# Patient Record
Sex: FEMALE | Race: WHITE | NOT HISPANIC OR LATINO | Employment: OTHER
[De-identification: names, ages, dates, MRNs, and addresses within clinical notes are randomized per-mention and may not be internally consistent; named-entity substitution may affect disease eponyms.]

---

## 2018-11-09 RX ORDER — DESONIDE 0.5 MG/G
OINTMENT TOPICAL 2 TIMES DAILY
COMMUNITY

## 2018-11-09 RX ORDER — LANOLIN ALCOHOL/MO/W.PET/CERES
CREAM (GRAM) TOPICAL DAILY
Status: ON HOLD | COMMUNITY
End: 2024-02-09

## 2018-11-12 ENCOUNTER — SURGERY (OUTPATIENT)
Age: 78
End: 2018-11-12

## 2018-11-12 ENCOUNTER — ANESTHESIA EVENT (OUTPATIENT)
Dept: SURGERY | Facility: CLINIC | Age: 78
End: 2018-11-12
Payer: COMMERCIAL

## 2018-11-12 ENCOUNTER — ANESTHESIA (OUTPATIENT)
Dept: SURGERY | Facility: CLINIC | Age: 78
End: 2018-11-12
Payer: COMMERCIAL

## 2018-11-12 ENCOUNTER — HOSPITAL ENCOUNTER (OUTPATIENT)
Facility: CLINIC | Age: 78
Discharge: HOME | End: 2018-11-12
Attending: OPHTHALMOLOGY | Admitting: OPHTHALMOLOGY
Payer: COMMERCIAL

## 2018-11-12 VITALS
TEMPERATURE: 96.8 F | RESPIRATION RATE: 16 BRPM | WEIGHT: 194 LBS | OXYGEN SATURATION: 92 % | SYSTOLIC BLOOD PRESSURE: 105 MMHG | HEIGHT: 57 IN | DIASTOLIC BLOOD PRESSURE: 75 MMHG | BODY MASS INDEX: 41.85 KG/M2

## 2018-11-12 DIAGNOSIS — H25.011 CORTICAL AGE-RELATED CATARACT OF RIGHT EYE: Primary | ICD-10-CM

## 2018-11-12 LAB
GLUCOSE BLDC GLUCOMTR-MCNC: 100 MG/DL (ref 70–99)
GLUCOSE BLDC GLUCOMTR-MCNC: 109 MG/DL (ref 70–99)

## 2018-11-12 PROCEDURE — 25000128 H RX IP 250 OP 636: Performed by: OPHTHALMOLOGY

## 2018-11-12 PROCEDURE — 25000128 H RX IP 250 OP 636: Performed by: NURSE ANESTHETIST, CERTIFIED REGISTERED

## 2018-11-12 PROCEDURE — 27210794 ZZH OR GENERAL SUPPLY STERILE: Performed by: OPHTHALMOLOGY

## 2018-11-12 PROCEDURE — V2632 POST CHMBR INTRAOCULAR LENS: HCPCS | Performed by: OPHTHALMOLOGY

## 2018-11-12 PROCEDURE — 37000008 ZZH ANESTHESIA TECHNICAL FEE, 1ST 30 MIN: Performed by: OPHTHALMOLOGY

## 2018-11-12 PROCEDURE — 36000101 ZZH EYE SURGERY LEVEL 3 1ST 30 MIN: Performed by: OPHTHALMOLOGY

## 2018-11-12 PROCEDURE — 25000125 ZZHC RX 250: Performed by: OPHTHALMOLOGY

## 2018-11-12 PROCEDURE — 25000128 H RX IP 250 OP 636: Performed by: ANESTHESIOLOGY

## 2018-11-12 PROCEDURE — 40000170 ZZH STATISTIC PRE-PROCEDURE ASSESSMENT II: Performed by: OPHTHALMOLOGY

## 2018-11-12 PROCEDURE — 71000028 ZZH EYE RECOVERY PHASE 2 EACH 15 MINS: Performed by: OPHTHALMOLOGY

## 2018-11-12 PROCEDURE — 25000125 ZZHC RX 250: Performed by: NURSE ANESTHETIST, CERTIFIED REGISTERED

## 2018-11-12 PROCEDURE — 82962 GLUCOSE BLOOD TEST: CPT

## 2018-11-12 DEVICE — EYE IMP IOL AMO PCL TECNIS ZCB00 24.5: Type: IMPLANTABLE DEVICE | Site: EYE | Status: FUNCTIONAL

## 2018-11-12 RX ORDER — BALANCED SALT SOLUTION 6.4; .75; .48; .3; 3.9; 1.7 MG/ML; MG/ML; MG/ML; MG/ML; MG/ML; MG/ML
SOLUTION OPHTHALMIC PRN
Status: DISCONTINUED | OUTPATIENT
Start: 2018-11-12 | End: 2018-11-12 | Stop reason: HOSPADM

## 2018-11-12 RX ORDER — PREDNISOLONE ACETATE 1 %
SUSPENSION, DROPS(FINAL DOSAGE FORM)(ML) OPHTHALMIC (EYE) PRN
Status: DISCONTINUED | OUTPATIENT
Start: 2018-11-12 | End: 2018-11-12 | Stop reason: HOSPADM

## 2018-11-12 RX ORDER — DICLOFENAC SODIUM 1 MG/ML
1 SOLUTION/ DROPS OPHTHALMIC
Status: COMPLETED | OUTPATIENT
Start: 2018-11-12 | End: 2018-11-12

## 2018-11-12 RX ORDER — TETRACAINE HYDROCHLORIDE 5 MG/ML
SOLUTION OPHTHALMIC PRN
Status: DISCONTINUED | OUTPATIENT
Start: 2018-11-12 | End: 2018-11-12 | Stop reason: HOSPADM

## 2018-11-12 RX ORDER — MOXIFLOXACIN 5 MG/ML
1 SOLUTION/ DROPS OPHTHALMIC
Status: COMPLETED | OUTPATIENT
Start: 2018-11-12 | End: 2018-11-12

## 2018-11-12 RX ORDER — SODIUM CHLORIDE, SODIUM LACTATE, POTASSIUM CHLORIDE, CALCIUM CHLORIDE 600; 310; 30; 20 MG/100ML; MG/100ML; MG/100ML; MG/100ML
500 INJECTION, SOLUTION INTRAVENOUS CONTINUOUS
Status: DISCONTINUED | OUTPATIENT
Start: 2018-11-12 | End: 2018-11-12 | Stop reason: HOSPADM

## 2018-11-12 RX ORDER — PROPARACAINE HYDROCHLORIDE 5 MG/ML
1 SOLUTION/ DROPS OPHTHALMIC ONCE
Status: DISCONTINUED | OUTPATIENT
Start: 2018-11-12 | End: 2018-11-12 | Stop reason: HOSPADM

## 2018-11-12 RX ORDER — ONDANSETRON 2 MG/ML
INJECTION INTRAMUSCULAR; INTRAVENOUS PRN
Status: DISCONTINUED | OUTPATIENT
Start: 2018-11-12 | End: 2018-11-12

## 2018-11-12 RX ORDER — TROPICAMIDE 10 MG/ML
1 SOLUTION/ DROPS OPHTHALMIC
Status: COMPLETED | OUTPATIENT
Start: 2018-11-12 | End: 2018-11-12

## 2018-11-12 RX ORDER — PROPARACAINE HYDROCHLORIDE 5 MG/ML
1 SOLUTION/ DROPS OPHTHALMIC ONCE
Status: COMPLETED | OUTPATIENT
Start: 2018-11-12 | End: 2018-11-12

## 2018-11-12 RX ORDER — LIDOCAINE HYDROCHLORIDE 10 MG/ML
INJECTION, SOLUTION EPIDURAL; INFILTRATION; INTRACAUDAL; PERINEURAL PRN
Status: DISCONTINUED | OUTPATIENT
Start: 2018-11-12 | End: 2018-11-12 | Stop reason: HOSPADM

## 2018-11-12 RX ORDER — PHENYLEPHRINE HYDROCHLORIDE 25 MG/ML
1 SOLUTION/ DROPS OPHTHALMIC
Status: COMPLETED | OUTPATIENT
Start: 2018-11-12 | End: 2018-11-12

## 2018-11-12 RX ADMIN — DICLOFENAC SODIUM 1 DROP: 1 SOLUTION OPHTHALMIC at 10:03

## 2018-11-12 RX ADMIN — TROPICAMIDE 1 DROP: 10 SOLUTION/ DROPS OPHTHALMIC at 09:50

## 2018-11-12 RX ADMIN — SODIUM CHLORIDE, POTASSIUM CHLORIDE, SODIUM LACTATE AND CALCIUM CHLORIDE 500 ML: 600; 310; 30; 20 INJECTION, SOLUTION INTRAVENOUS at 10:10

## 2018-11-12 RX ADMIN — TROPICAMIDE 1 DROP: 10 SOLUTION/ DROPS OPHTHALMIC at 09:56

## 2018-11-12 RX ADMIN — LIDOCAINE HYDROCHLORIDE 1 ML: 10 INJECTION, SOLUTION EPIDURAL; INFILTRATION; INTRACAUDAL; PERINEURAL at 10:48

## 2018-11-12 RX ADMIN — PHENYLEPHRINE HYDROCHLORIDE 1 DROP: 2.5 SOLUTION/ DROPS OPHTHALMIC at 09:50

## 2018-11-12 RX ADMIN — ONDANSETRON 4 MG: 2 INJECTION INTRAMUSCULAR; INTRAVENOUS at 10:36

## 2018-11-12 RX ADMIN — DEXMEDETOMIDINE HYDROCHLORIDE 12 MCG: 100 INJECTION, SOLUTION INTRAVENOUS at 10:33

## 2018-11-12 RX ADMIN — MOXIFLOXACIN 1 DROP: 5 SOLUTION/ DROPS OPHTHALMIC at 09:50

## 2018-11-12 RX ADMIN — TETRACAINE HYDROCHLORIDE 2 DROP: 5 SOLUTION OPHTHALMIC at 10:42

## 2018-11-12 RX ADMIN — TROPICAMIDE 1 DROP: 10 SOLUTION/ DROPS OPHTHALMIC at 10:03

## 2018-11-12 RX ADMIN — PROPARACAINE HYDROCHLORIDE 1 DROP: 5 SOLUTION/ DROPS OPHTHALMIC at 09:52

## 2018-11-12 RX ADMIN — Medication 1 APPLICATOR: at 10:48

## 2018-11-12 RX ADMIN — MOXIFLOXACIN 1 DROP: 5 SOLUTION/ DROPS OPHTHALMIC at 09:56

## 2018-11-12 RX ADMIN — DICLOFENAC SODIUM 1 DROP: 1 SOLUTION OPHTHALMIC at 09:56

## 2018-11-12 RX ADMIN — CEFUROXIME 0.1 ML: 1.5 INJECTION, POWDER, FOR SOLUTION INTRAVENOUS at 10:53

## 2018-11-12 RX ADMIN — Medication 1 DROP: at 10:53

## 2018-11-12 RX ADMIN — MOXIFLOXACIN 1 DROP: 5 SOLUTION/ DROPS OPHTHALMIC at 10:03

## 2018-11-12 RX ADMIN — PHENYLEPHRINE HYDROCHLORIDE 1 DROP: 2.5 SOLUTION/ DROPS OPHTHALMIC at 09:56

## 2018-11-12 RX ADMIN — DEXMEDETOMIDINE HYDROCHLORIDE 8 MCG: 100 INJECTION, SOLUTION INTRAVENOUS at 10:37

## 2018-11-12 RX ADMIN — DICLOFENAC SODIUM 1 DROP: 1 SOLUTION OPHTHALMIC at 09:50

## 2018-11-12 RX ADMIN — EPINEPHRINE 500 ML: 1 INJECTION, SOLUTION, CONCENTRATE INTRAVENOUS at 10:47

## 2018-11-12 RX ADMIN — BALANCED SALT SOLUTION 15 ML: 6.4; .75; .48; .3; 3.9; 1.7 SOLUTION OPHTHALMIC at 10:47

## 2018-11-12 RX ADMIN — PHENYLEPHRINE HYDROCHLORIDE 1 DROP: 2.5 SOLUTION/ DROPS OPHTHALMIC at 10:03

## 2018-11-12 ASSESSMENT — LIFESTYLE VARIABLES: TOBACCO_USE: 0

## 2018-11-12 ASSESSMENT — ENCOUNTER SYMPTOMS: SEIZURES: 0

## 2018-11-12 NOTE — IP AVS SNAPSHOT
[facility]    [address]    [place] [address]    Phone:  [phone]    Fax:  [phone]                                       After Visit Summary   11/12/2018    Leola Anguiano    MRN: [id number]           After Visit Summary Signature Page     I have received my discharge instructions, and my questions have been answered. I have discussed any challenges I see with this plan with the nurse or doctor.    ..........................................................................................................................................  Patient/Patient Representative Signature      ..........................................................................................................................................  Patient Representative Print Name and Relationship to Patient    ..................................................               ................................................  Date                                   Time    ..........................................................................................................................................  Reviewed by Signature/Title    ...................................................              ..............................................  Date                                               Time          22EPIC Rev 08/18

## 2018-11-12 NOTE — ANESTHESIA CARE TRANSFER NOTE
Patient: Leola Anguiano    Procedure(s):  RIGHT EYE PHACOEMULSIFICATION CLEAR CORNEA WITH STANDARD INTRAOCULAR LENS IMPLANT     Diagnosis: CATARACT   Diagnosis Additional Information: No value filed.    Anesthesia Type:   MAC     Note:  Airway :Room Air  Patient transferred to:PACU  Comments: Anesthesia Care Note    Patient: Leola Anguiano    Transferred to: PACU    Patient vital signs: Stable    Airway: None    Monitors placed. VSS. PIV patent. No change in dentition. Report given to DICKSON Lindo CRNA   11/12/2018  Handoff Report: Identifed the Patient, Identified the Reponsible Provider, Reviewed the pertinent medical history, Discussed the surgical course, Reviewed Intra-OP anesthesia mangement and issues during anesthesia, Set expectations for post-procedure period and Allowed opportunity for questions and acknowledgement of understanding      Vitals: (Last set prior to Anesthesia Care Transfer)    CRNA VITALS  11/12/2018 1028 - 11/12/2018 1101      11/12/2018             SpO2: 96 %    Resp Rate (set): 10                Electronically Signed By: JUAN ALBERTO Farrar CRNA  November 12, 2018  11:01 AM

## 2018-11-12 NOTE — IP AVS SNAPSHOT
MRN:[id number]                      After Visit Summary   11/12/2018    Leola Anguiano    MRN: [id number]           Thank you!     Thank you for choosing [facility] for your care. Our goal is always to provide you with excellent care. Hearing back from our patients is one way we can continue to improve our services. Please take a few minutes to complete the written survey that you may receive in the mail after you visit with us. Thank you!        Patient Information     Date Of Birth          1940        About your hospital stay     You were admitted on:  November 12, 2018 You last received care in the:  [facility] [place]    You were discharged on:  November 12, 2018       Who to Call     For medical emergencies, please call 911.  For non-urgent questions about your medical care, please call your primary care provider or clinic, [phone]  For questions related to your surgery, please call your surgery clinic        Attending Provider     Provider Specialty    Marco A Hernandez MD Ophthalmology       Primary Care Provider Office Phone # Fax #    Roger Corral [phone]      Further instructions from your care team       [facility] Discharge  Instructions  Anesthesia (Eye Care Center)   Adult Discharge Instructions    For 24 hours after surgery    1. Get plenty of rest.  Make arrangements to have a responsible adult stay with you for at least 24 hours after you leave the hospital.  2. Do not drive or use heavy equipment for 24 hours.    3. Do not drink alcohol for 24 hours.  4. Do not sign legal documents or make important decisions for 24 hours.  5. Avoid strenuous or risky activities. You may feel lightheaded.  If so, sit for a few minutes before standing.  Have someone help you get up.   6. Conscious sedation patients may resume a regular diet..  7. Any questions of medical nature, call your physician.    Dr. Strickland  "David  [facility]  [phone]  Post Operative Cataract Instructions        You may remove the eye shield on arrival home and begin eye drops as directed when you get home.    Prednisolone - 1 drop 3 times/day until gone  Ofloxacin - 1 drop 3 times/day until gone   Ketorolac - 1 drop 3 times/day until gone        Wear eye shield when sleeping for protection for 5 days.      Do not rub the operated eye.      Light sensitivity may be noticed. Sunglasses may be worn for comfort.      Some discomfort and irritation may be noticed. Acetaminophen (Tylenol) or Ibuprofen (Advil) may be taken for discomfort. If pain persists please call Dr. Hernandez's office.      Keep the operated eye dry. You may wash your hair, bathe or shower, but keep the operated eye closed while doing so.       Use medication exactly as prescribed by your doctor. You may restart your regular home medications.      If your procedure included an ECP (Endoscopic Cyclophotocoagulation), you should take Diamox 500 mg at bedtime as directed by your physician.       No eye makeup on or around the eye for 1 week.      Bring any glaucoma medications with you to the clinic on your first post operative visit.      Call Dr. eHrnandez's office if any of the following should occur:    o Any sudden vision changes  o Nausea or severe headache  o Increase in pain not controlled  o Or signs of infection (pus, increasing redness or tenderness)    Pending Results     No orders found from 11/10/2018 to 11/13/2018.            Admission Information     Date & Time Provider Department Dept. Phone    11/12/2018 Marco A Hernandez MD [facility] [phone]      Your Vitals Were     Blood Pressure Temperature Respirations Height Weight Pulse Oximetry    105/75 96.8  F (36  C) (Temporal) 16 1.448 m (4' 9\") 88 kg (194 lb 0.1 oz) 92%    BMI (Body Mass Index)                   41.98 kg/m2           [employer] Information     [employer] lets you send messages to " "your doctor, view your test results, renew your prescriptions, schedule appointments and more. To sign up, go to [email or web address] . Click on \"Log in\" on the left side of the screen, which will take you to the Welcome page. Then click on \"Sign up Now\" on the right side of the page.     You will be asked to enter the access code listed below, as well as some personal information. Please follow the directions to create your username and password.     Your access code is: [email or web address]  Expires: 2/10/2019 11:27 AM     Your access code will  in 90 days. If you need help or a new code, please call your [facility] clinic or [phone].        Care EveryWhere ID     This is your Care EveryWhere ID. This could be used by other organizations to access your [facility] medical records  [id number]        Equal Access to Services     [facility] : Ritu Subramanian, pili norwood, honey elliott, yvon ortiz . So [facility] [phone].    ATENCIÓN: Si habla español, tiene a walker disposición servicios gratuitos de asistencia lingüística. Llame al [phone].    We comply with applicable federal civil rights laws and [place] laws. We do not discriminate on the basis of race, color, national origin, age, disability, sex, sexual orientation, or gender identity.               Review of your medicines      UNREVIEWED medicines. Ask your doctor about these medicines        Dose / Directions    ACETAMINOPHEN PO        Dose:  325 mg   Take 325 mg by mouth every 4 hours as needed for pain   Refills:  0       ASPIRIN PO        Dose:  81 mg   Take 81 mg by mouth daily   Refills:  0       CALCIUM 600 PO        Take by mouth 2 times daily   Refills:  0       COLACE PO        Dose:  100 mg   Take 100 mg by mouth 2 times daily   Refills:  0       cyanocobalamin 1000 MCG tablet   Commonly known as:  vitamin  B-12        Take by mouth daily   Refills:  0       desonide 0.05 % " ointment   Commonly known as:  DESOWEN        Apply topically 2 times daily   Refills:  0       hydrocortisone 2.5 % cream   Commonly known as:  ANUSOL-HC        Place rectally 2 times daily as needed for hemorrhoids   Refills:  0       IBUPROFEN PO        Dose:  200 mg   Take 200 mg by mouth every 6 hours as needed for moderate pain   Refills:  0       LIPITOR PO        Dose:  40 mg   Take 40 mg by mouth daily   Refills:  0       METFORMIN HCL PO        Dose:  500 mg   Take 500 mg by mouth 2 times daily (with meals)   Refills:  0       OMEGA-3 & OMEGA-6 FISH OIL PO        Refills:  0       TOPROL XL PO        Dose:  25 mg   Take 25 mg by mouth 2 times daily   Refills:  0                Protect others around you: Learn how to safely use, store and throw away your medicines at www.disposemymeds.org.             Medication List: This is a list of all your medications and when to take them. Check marks below indicate your daily home schedule. Keep this list as a reference.      Medications           Morning Afternoon Evening Bedtime As Needed    ACETAMINOPHEN PO   Take 325 mg by mouth every 4 hours as needed for pain                                ASPIRIN PO   Take 81 mg by mouth daily                                CALCIUM 600 PO   Take by mouth 2 times daily                                COLACE PO   Take 100 mg by mouth 2 times daily                                cyanocobalamin 1000 MCG tablet   Commonly known as:  vitamin  B-12   Take by mouth daily                                desonide 0.05 % ointment   Commonly known as:  DESOWEN   Apply topically 2 times daily                                hydrocortisone 2.5 % cream   Commonly known as:  ANUSOL-HC   Place rectally 2 times daily as needed for hemorrhoids                                IBUPROFEN PO   Take 200 mg by mouth every 6 hours as needed for moderate pain                                LIPITOR PO   Take 40 mg by mouth daily                                 METFORMIN HCL PO   Take 500 mg by mouth 2 times daily (with meals)                                OMEGA-3 & OMEGA-6 FISH OIL PO                                TOPROL XL PO   Take 25 mg by mouth 2 times daily

## 2018-11-12 NOTE — ANESTHESIA POSTPROCEDURE EVALUATION
Patient: Leola Anguiano    Procedure(s):  RIGHT EYE PHACOEMULSIFICATION CLEAR CORNEA WITH STANDARD INTRAOCULAR LENS IMPLANT     Diagnosis:CATARACT   Diagnosis Additional Information: No value filed.    Anesthesia Type:  MAC    Note:  Anesthesia Post Evaluation    Patient location during evaluation: PACU  Patient participation: Able to fully participate in evaluation  Level of consciousness: awake and alert  Pain management: satisfactory to patient  Airway patency: patent  Cardiovascular status: hemodynamically stable  Respiratory status: acceptable and unassisted  Hydration status: balanced  PONV: none     Anesthetic complications: None          Last vitals:  Vitals:    11/12/18 1009 11/12/18 1101 11/12/18 1121   BP: 141/80 122/85 105/75   Resp: 14 16 16   Temp: 36  C (96.8  F)     SpO2:  95% 92%         Electronically Signed By: Elias Wilson MD  November 12, 2018  3:53 PM

## 2018-11-12 NOTE — OR NURSING
Post op instructions reviewed with pt and son thru . Post op bgm = 109. Instructed pt to check bld sugars more often the next 24 hrs post surgery. VSS, denies pain. Son here for . D/C'd to home, no further questions, son/pt verbalized understanding of discharge instructions.

## 2018-11-12 NOTE — ANESTHESIA PREPROCEDURE EVALUATION
Anesthesia Evaluation     . Pt has had prior anesthetic.     No history of anesthetic complications          ROS/MED HX    ENT/Pulmonary:      (-) tobacco use and sleep apnea   Neurologic:      (-) seizures and CVA   Cardiovascular:     (+) Dyslipidemia, hypertension----. : . . . :. .       METS/Exercise Tolerance:     Hematologic:         Musculoskeletal:         GI/Hepatic:        (-) GERD and liver disease   Renal/Genitourinary:      (-) renal disease   Endo:     (+) type II DM Obesity (BMI 42), .   (-) thyroid disease   Psychiatric:         Infectious Disease:         Malignancy:         Other:                     Physical Exam  Normal systems: cardiovascular, pulmonary and dental    Airway   Mallampati: II  TM distance: >3 FB  Neck ROM: full    Dental     Cardiovascular       Pulmonary                     Anesthesia Plan      History & Physical Review  History and physical reviewed and following examination; no interval change.    ASA Status:  3 .    NPO Status:  > 8 hours    Plan for MAC Reason for MAC:  Procedure to face, neck, head or breast  PONV prophylaxis:  Ondansetron (or other 5HT-3)       Postoperative Care      Consents  Anesthetic plan, risks, benefits and alternatives discussed with:  Patient..        Procedure: Procedure(s):  RIGHT EYE PHACOEMULSIFICATION CLEAR CORNEA WITH STANDARD INTRAOCULAR LENS IMPLANT ( MAC TOPICAL) ( LANGUAGE : Tunisian )  Preop diagnosis: CATARACT     Allergies   Allergen Reactions     Betadine [Povidone Iodine]      Chlorhexidine      Hibiclens      Past Medical History:   Diagnosis Date     Arthritis      Diabetes (H)      Hyperlipemia      Hyperlipemia      Hypertension      Obese      Palpitations      Rotator cuff tear, right      Tachycardia, unspecified      Vitamin B12 deficiency      Past Surgical History:   Procedure Laterality Date     CHOLECYSTECTOMY       GYN SURGERY      hyst     HEMORRHOIDECTOMY       HERNIA REPAIR       ORTHOPEDIC SURGERY      rotator cuff  repair     Prior to Admission medications    Medication Sig Start Date End Date Taking? Authorizing Provider   ACETAMINOPHEN PO Take 325 mg by mouth every 4 hours as needed for pain   Yes Reported, Patient   ASPIRIN PO Take 81 mg by mouth daily   Yes Reported, Patient   Atorvastatin Calcium (LIPITOR PO) Take 40 mg by mouth daily   Yes Reported, Patient   Calcium Carbonate (CALCIUM 600 PO) Take by mouth 2 times daily   Yes Reported, Patient   cyanocobalamin (VITAMIN  B-12) 1000 MCG tablet Take by mouth daily   Yes Reported, Patient   desonide (DESOWEN) 0.05 % ointment Apply topically 2 times daily   Yes Reported, Patient   Docusate Sodium (COLACE PO) Take 100 mg by mouth 2 times daily   Yes Reported, Patient   hydrocortisone (ANUSOL-HC) 2.5 % cream Place rectally 2 times daily as needed for hemorrhoids   Yes Reported, Patient   IBUPROFEN PO Take 200 mg by mouth every 6 hours as needed for moderate pain   Yes Reported, Patient   METFORMIN HCL PO Take 500 mg by mouth 2 times daily (with meals)   Yes Reported, Patient   Metoprolol Succinate (TOPROL XL PO) Take 25 mg by mouth 2 times daily   Yes Reported, Patient   Omega 3-6-9 Fatty Acids (OMEGA-3 & OMEGA-6 FISH OIL PO)    Yes Reported, Patient     Current Facility-Administered Medications Ordered in Epic   Medication Dose Route Frequency Last Rate Last Dose     diclofenac (VOLTAREN) 0.1 % ophthalmic solution 1 drop  1 drop Ophthalmic q5 Min Prior to Surgery         lactated ringers infusion  500 mL Intravenous Continuous         lidocaine (AKTEN) ophthalmic gel 0.5 mL  0.5 mL Ophthalmic Once         moxifloxacin (VIGAMOX) 0.5 % ophthalmic solution 1 drop  1 drop Ophthalmic q5 Min Prior to Surgery         phenylephrine (MYDFRIN /MONA-SYNEPHRINE) 2.5 % ophthalmic solution 1 drop  1 drop Ophthalmic q5 Min Prior to Surgery         proparacaine (ALCAINE) 0.5 % ophthalmic solution 1 drop  1 drop Ophthalmic Once         proparacaine (ALCAINE) 0.5 % ophthalmic solution 1 drop  1  drop Ophthalmic Once         tropicamide (MYDRIACYL) 1 % ophthalmic solution 1 drop  1 drop Ophthalmic q5 Min Prior to Surgery         No current [facility]-ordered outpatient prescriptions on file.     Wt Readings from Last 1 Encounters:   11/09/18 88 kg (194 lb 0.1 oz)     Temp Readings from Last 1 Encounters:   No data found for Temp     BP Readings from Last 6 Encounters:   No data found for BP     Pulse Readings from Last 4 Encounters:   No data found for Pulse     Resp Readings from Last 1 Encounters:   No data found for Resp     SpO2 Readings from Last 1 Encounters:   No data found for SpO2     No results for input(s): NA, POTASSIUM, CHLORIDE, CO2, ANIONGAP, GLC, BUN, CR, EMMA in the last 60715 hours.  No results for input(s): WBC, HGB, PLT in the last 06943 hours.  No results for input(s): INR in the last 68652 hours.    Invalid input(s): APTT   RECENT LABS:   ECG:   ECHO:   CXR:                        .

## 2018-11-12 NOTE — DISCHARGE INSTRUCTIONS
[facility] Anesthesia [facility] Care [facility] Discharge  Instructions  Anesthesia (Eye Care Center)   Adult Discharge Instructions    For 24 hours after surgery    1. Get plenty of rest.  Make arrangements to have a responsible adult stay with you for at least 24 hours after you leave the hospital.  2. Do not drive or use heavy equipment for 24 hours.    3. Do not drink alcohol for 24 hours.  4. Do not sign legal documents or make important decisions for 24 hours.  5. Avoid strenuous or risky activities. You may feel lightheaded.  If so, sit for a few minutes before standing.  Have someone help you get up.   6. Conscious sedation patients may resume a regular diet..  7. Any questions of medical nature, call your physician.    Dr. Marco A Hernandez  [facility]  [phone]  Post Operative Cataract Instructions        You may remove the eye shield on arrival home and begin eye drops as directed when you get home.    Prednisolone - 1 drop 3 times/day until gone  Ofloxacin - 1 drop 3 times/day until gone   Ketorolac - 1 drop 3 times/day until gone        Wear eye shield when sleeping for protection for 5 days.      Do not rub the operated eye.      Light sensitivity may be noticed. Sunglasses may be worn for comfort.      Some discomfort and irritation may be noticed. Acetaminophen (Tylenol) or Ibuprofen (Advil) may be taken for discomfort. If pain persists please call Dr. Hernandez's office.      Keep the operated eye dry. You may wash your hair, bathe or shower, but keep the operated eye closed while doing so.       Use medication exactly as prescribed by your doctor. You may restart your regular home medications.      If your procedure included an ECP (Endoscopic Cyclophotocoagulation), you should take Diamox 500 mg at bedtime as directed by your physician.       No eye makeup on or around the eye for 1 week.      Bring any glaucoma medications with you to the clinic on your first post operative visit.      Call  Dr. Hernandez's office if any of the following should occur:    o Any sudden vision changes  o Nausea or severe headache  o Increase in pain not controlled  o Or signs of infection (pus, increasing redness or tenderness)

## 2018-11-19 NOTE — OP NOTE
PREOPERATIVE DIAGNOSIS: Visually significant cataract, Right eye   POSTOPERATIVE DIAGNOSIS: Same   PROCEDURES:   1. Cataract extraction with intraocular lens implant Right eye.  SURGEON: Marco A Hernandez MD   INDICATIONS: The patient Leola Anguiano presented to the eye clinic with decreased vision secondary to cataract in the Right eye. The risks, benefits and alternatives to cataract extraction were discussed. The patient elected to proceed. All questions were answered to the patient's satisfaction.   DESCRIPTION OF PROCEDURE: The patient was brought to the operating suite and the Right eye was prepped and draped in a sterile fashion.  A paracentesis was created using the Supersharp blade.  1% non-preserved Lidocaine was then injected into the anterior chamber, followed by viscoelastic. A temporal clear corneal wound was created using the keratome. The cystotome was introduced and an anterior capsulorrhexis begun which was completed using the Utrata forceps. Hydrodissection was performed, and the nucleus was rotated. The phacoemulsification handpiece was introduced, and the nucleus was grooved and subsequently cracked. The nucleus was rotated and chopped into smaller segments using the Miguel chopper. These segments were emulsified and aspirated from the eye. The remaining lens cortex was removed using irrigation-aspiration. The capsular bag was filled with viscoelastic, and an intraocular lens was injected into the capsular bag (see below). Remaining viscoelastic was removed using irrigation-aspiration. The anterior chamber was instilled with balanced salt solution and found to be watertight.  0.1cc Cefuroxime was then injected via the paracentesis.  Antibiotic and steroid drops were applied to the ocular surface which was then covered with a shield. The patient was transferred to the Post Anesthesia Care Unit in satisfactory condition.   PLAN: The patient will be discharged to home and will follow up tomorrow  morning in the eye clinic.  EBL:  None  Complications:  None  Specimens:  None  Findings:  Cataract    Implant Name Type Inv. Item Serial No.  Lot No. LRB No. Used   EYE IMP IOL HARSHIL PCL TECNIS ZCB00 24.5 Lens/Eye Implant EYE IMP IOL HARSHIL PCL TECNIS ZCB00 24.5 [id number] [employer]   Right 1     CC:  Marco A Hernandez MD - [facility]

## 2024-02-08 ENCOUNTER — HOSPITAL ENCOUNTER (INPATIENT)
Facility: CLINIC | Age: 84
LOS: 4 days | Discharge: HOME HEALTH | DRG: 177 | End: 2024-02-13
Attending: EMERGENCY MEDICINE | Admitting: INTERNAL MEDICINE
Payer: COMMERCIAL

## 2024-02-08 DIAGNOSIS — R74.8 ELEVATED CK: ICD-10-CM

## 2024-02-08 DIAGNOSIS — M10.9 GOUT, UNSPECIFIED CAUSE, UNSPECIFIED CHRONICITY, UNSPECIFIED SITE: ICD-10-CM
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
## 2024-02-08 DIAGNOSIS — K59.00 CONSTIPATION, UNSPECIFIED CONSTIPATION TYPE: ICD-10-CM

## 2024-02-08 DIAGNOSIS — J96.01 ACUTE RESPIRATORY FAILURE WITH HYPOXIA (H): ICD-10-CM

## 2024-02-08 DIAGNOSIS — E78.5 HYPERLIPIDEMIA, UNSPECIFIED HYPERLIPIDEMIA TYPE: Primary | ICD-10-CM

## 2024-02-08 DIAGNOSIS — R53.1 GENERALIZED WEAKNESS: ICD-10-CM

## 2024-02-08 DIAGNOSIS — I10 HYPERTENSION, UNSPECIFIED TYPE: ICD-10-CM

## 2024-02-08 DIAGNOSIS — U07.1 COVID-19 VIRUS INFECTION: ICD-10-CM

## 2024-02-08 DIAGNOSIS — E55.9 VITAMIN D DEFICIENCY: ICD-10-CM

## 2024-02-08 DIAGNOSIS — E11.69 TYPE 2 DIABETES MELLITUS WITH OTHER SPECIFIED COMPLICATION, WITHOUT LONG-TERM CURRENT USE OF INSULIN (H): ICD-10-CM

## 2024-02-08 PROCEDURE — 99285 EMERGENCY DEPT VISIT HI MDM: CPT | Mod: 25

## 2024-02-08 PROCEDURE — 0HQ0XZZ REPAIR SCALP SKIN, EXTERNAL APPROACH: ICD-10-PCS | Performed by: EMERGENCY MEDICINE

## 2024-02-08 PROCEDURE — 93005 ELECTROCARDIOGRAM TRACING: CPT

## 2024-02-08 PROCEDURE — 96360 HYDRATION IV INFUSION INIT: CPT

## 2024-02-09 ENCOUNTER — APPOINTMENT (OUTPATIENT)
Dept: GENERAL RADIOLOGY | Facility: CLINIC | Age: 84
DRG: 177 | End: 2024-02-09
Attending: EMERGENCY MEDICINE
Payer: COMMERCIAL

## 2024-02-09 ENCOUNTER — APPOINTMENT (OUTPATIENT)
Dept: OCCUPATIONAL THERAPY | Facility: CLINIC | Age: 84
DRG: 177 | End: 2024-02-09
Attending: INTERNAL MEDICINE
Payer: COMMERCIAL

## 2024-02-09 ENCOUNTER — APPOINTMENT (OUTPATIENT)
Dept: CT IMAGING | Facility: CLINIC | Age: 84
DRG: 177 | End: 2024-02-09
Attending: EMERGENCY MEDICINE
Payer: COMMERCIAL

## 2024-02-09 ENCOUNTER — APPOINTMENT (OUTPATIENT)
Dept: PHYSICAL THERAPY | Facility: CLINIC | Age: 84
DRG: 177 | End: 2024-02-09
Attending: INTERNAL MEDICINE
Payer: COMMERCIAL

## 2024-02-09 PROBLEM — J96.01 ACUTE RESPIRATORY FAILURE WITH HYPOXIA (H): Status: ACTIVE | Noted: 2024-02-09
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
## 2024-02-09 PROBLEM — R53.1 GENERALIZED WEAKNESS: Status: ACTIVE | Noted: 2024-02-09

## 2024-02-09 PROBLEM — U07.1 COVID-19 VIRUS INFECTION: Status: ACTIVE | Noted: 2024-02-09

## 2024-02-09 PROBLEM — R74.8 ELEVATED CK: Status: ACTIVE | Noted: 2024-02-09

## 2024-02-09 LAB
ALBUMIN SERPL BCG-MCNC: 3.8 G/DL (ref 3.5–5.2)
ALBUMIN UR-MCNC: 10 MG/DL
ALP SERPL-CCNC: 71 U/L (ref 40–150)
ALT SERPL W P-5'-P-CCNC: 48 U/L (ref 0–50)
ANION GAP SERPL CALCULATED.3IONS-SCNC: 16 MMOL/L (ref 7–15)
APPEARANCE UR: CLEAR
AST SERPL W P-5'-P-CCNC: 68 U/L (ref 0–45)
ATRIAL RATE - MUSE: 79 BPM
BASOPHILS # BLD AUTO: 0 10E3/UL (ref 0–0.2)
BASOPHILS NFR BLD AUTO: 0 %
BILIRUB SERPL-MCNC: 0.3 MG/DL
BILIRUB UR QL STRIP: NEGATIVE
BUN SERPL-MCNC: 28.8 MG/DL (ref 8–23)
CALCIUM SERPL-MCNC: 9 MG/DL (ref 8.8–10.2)
CHLORIDE SERPL-SCNC: 96 MMOL/L (ref 98–107)
CK SERPL-CCNC: 1781 U/L (ref 26–192)
COLOR UR AUTO: YELLOW
CREAT SERPL-MCNC: 0.81 MG/DL (ref 0.51–0.95)
CRP SERPL-MCNC: 54.47 MG/L
DEPRECATED HCO3 PLAS-SCNC: 22 MMOL/L (ref 22–29)
DIASTOLIC BLOOD PRESSURE - MUSE: NORMAL MMHG
EGFRCR SERPLBLD CKD-EPI 2021: 72 ML/MIN/1.73M2
EOSINOPHIL # BLD AUTO: 0 10E3/UL (ref 0–0.7)
EOSINOPHIL NFR BLD AUTO: 0 %
ERYTHROCYTE [DISTWIDTH] IN BLOOD BY AUTOMATED COUNT: 14.8 % (ref 10–15)
FLUAV RNA SPEC QL NAA+PROBE: NEGATIVE
FLUBV RNA RESP QL NAA+PROBE: NEGATIVE
GLUCOSE BLDC GLUCOMTR-MCNC: 113 MG/DL (ref 70–99)
GLUCOSE BLDC GLUCOMTR-MCNC: 159 MG/DL (ref 70–99)
GLUCOSE BLDC GLUCOMTR-MCNC: 164 MG/DL (ref 70–99)
GLUCOSE BLDC GLUCOMTR-MCNC: 89 MG/DL (ref 70–99)
GLUCOSE SERPL-MCNC: 121 MG/DL (ref 70–99)
GLUCOSE UR STRIP-MCNC: NEGATIVE MG/DL
HCT VFR BLD AUTO: 35.2 % (ref 35–47)
HGB BLD-MCNC: 11.7 G/DL (ref 11.7–15.7)
HGB UR QL STRIP: ABNORMAL
HOLD SPECIMEN: NORMAL
IMM GRANULOCYTES # BLD: 0.1 10E3/UL
IMM GRANULOCYTES NFR BLD: 1 %
INTERPRETATION ECG - MUSE: NORMAL
KETONES UR STRIP-MCNC: ABNORMAL MG/DL
LEUKOCYTE ESTERASE UR QL STRIP: NEGATIVE
LYMPHOCYTES # BLD AUTO: 1.3 10E3/UL (ref 0.8–5.3)
LYMPHOCYTES NFR BLD AUTO: 16 %
MAGNESIUM SERPL-MCNC: 1.8 MG/DL (ref 1.7–2.3)
MCH RBC QN AUTO: 31 PG (ref 26.5–33)
MCHC RBC AUTO-ENTMCNC: 33.2 G/DL (ref 31.5–36.5)
MCV RBC AUTO: 93 FL (ref 78–100)
MONOCYTES # BLD AUTO: 1.3 10E3/UL (ref 0–1.3)
MONOCYTES NFR BLD AUTO: 16 %
MUCOUS THREADS #/AREA URNS LPF: PRESENT /LPF
NEUTROPHILS # BLD AUTO: 5.4 10E3/UL (ref 1.6–8.3)
NEUTROPHILS NFR BLD AUTO: 67 %
NITRATE UR QL: NEGATIVE
NRBC # BLD AUTO: 0 10E3/UL
NRBC BLD AUTO-RTO: 0 /100
P AXIS - MUSE: 31 DEGREES
PH UR STRIP: 5.5 [PH] (ref 5–7)
PLATELET # BLD AUTO: 285 10E3/UL (ref 150–450)
POTASSIUM SERPL-SCNC: 3.4 MMOL/L (ref 3.4–5.3)
PR INTERVAL - MUSE: 170 MS
PROT SERPL-MCNC: 7.2 G/DL (ref 6.4–8.3)
QRS DURATION - MUSE: 96 MS
QT - MUSE: 376 MS
QTC - MUSE: 431 MS
R AXIS - MUSE: -31 DEGREES
RBC # BLD AUTO: 3.78 10E6/UL (ref 3.8–5.2)
RBC URINE: <1 /HPF
RSV RNA SPEC NAA+PROBE: NEGATIVE
SARS-COV-2 RNA RESP QL NAA+PROBE: POSITIVE
SODIUM SERPL-SCNC: 134 MMOL/L (ref 135–145)
SP GR UR STRIP: 1.03 (ref 1–1.03)
SQUAMOUS EPITHELIAL: <1 /HPF
SYSTOLIC BLOOD PRESSURE - MUSE: NORMAL MMHG
T AXIS - MUSE: 10 DEGREES
UROBILINOGEN UR STRIP-MCNC: NORMAL MG/DL
VENTRICULAR RATE- MUSE: 79 BPM
WBC # BLD AUTO: 8 10E3/UL (ref 4–11)
WBC URINE: <1 /HPF

## 2024-02-09 PROCEDURE — 85025 COMPLETE CBC W/AUTO DIFF WBC: CPT | Performed by: EMERGENCY MEDICINE

## 2024-02-09 PROCEDURE — 97161 PT EVAL LOW COMPLEX 20 MIN: CPT | Mod: GP | Performed by: PHYSICAL THERAPIST

## 2024-02-09 PROCEDURE — 81001 URINALYSIS AUTO W/SCOPE: CPT | Performed by: EMERGENCY MEDICINE

## 2024-02-09 PROCEDURE — 250N000011 HC RX IP 250 OP 636: Performed by: INTERNAL MEDICINE

## 2024-02-09 PROCEDURE — 83735 ASSAY OF MAGNESIUM: CPT | Performed by: EMERGENCY MEDICINE

## 2024-02-09 PROCEDURE — 258N000003 HC RX IP 258 OP 636: Performed by: EMERGENCY MEDICINE

## 2024-02-09 PROCEDURE — 258N000003 HC RX IP 258 OP 636: Performed by: INTERNAL MEDICINE

## 2024-02-09 PROCEDURE — 99223 1ST HOSP IP/OBS HIGH 75: CPT | Performed by: INTERNAL MEDICINE

## 2024-02-09 PROCEDURE — 87637 SARSCOV2&INF A&B&RSV AMP PRB: CPT | Performed by: EMERGENCY MEDICINE

## 2024-02-09 PROCEDURE — 36415 COLL VENOUS BLD VENIPUNCTURE: CPT | Performed by: EMERGENCY MEDICINE

## 2024-02-09 PROCEDURE — 97530 THERAPEUTIC ACTIVITIES: CPT | Mod: GO

## 2024-02-09 PROCEDURE — 97535 SELF CARE MNGMENT TRAINING: CPT | Mod: GO

## 2024-02-09 PROCEDURE — 71046 X-RAY EXAM CHEST 2 VIEWS: CPT

## 2024-02-09 PROCEDURE — 250N000011 HC RX IP 250 OP 636: Performed by: EMERGENCY MEDICINE

## 2024-02-09 PROCEDURE — 97530 THERAPEUTIC ACTIVITIES: CPT | Mod: GP | Performed by: PHYSICAL THERAPIST

## 2024-02-09 PROCEDURE — 97165 OT EVAL LOW COMPLEX 30 MIN: CPT | Mod: GO

## 2024-02-09 PROCEDURE — 90471 IMMUNIZATION ADMIN: CPT | Performed by: EMERGENCY MEDICINE

## 2024-02-09 PROCEDURE — 250N000013 HC RX MED GY IP 250 OP 250 PS 637: Performed by: INTERNAL MEDICINE

## 2024-02-09 PROCEDURE — 82550 ASSAY OF CK (CPK): CPT | Performed by: EMERGENCY MEDICINE

## 2024-02-09 PROCEDURE — 250N000012 HC RX MED GY IP 250 OP 636 PS 637: Performed by: INTERNAL MEDICINE

## 2024-02-09 PROCEDURE — 90715 TDAP VACCINE 7 YRS/> IM: CPT | Performed by: EMERGENCY MEDICINE

## 2024-02-09 PROCEDURE — 72125 CT NECK SPINE W/O DYE: CPT

## 2024-02-09 PROCEDURE — 120N000001 HC R&B MED SURG/OB

## 2024-02-09 PROCEDURE — 70450 CT HEAD/BRAIN W/O DYE: CPT

## 2024-02-09 PROCEDURE — XW033E5 INTRODUCTION OF REMDESIVIR ANTI-INFECTIVE INTO PERIPHERAL VEIN, PERCUTANEOUS APPROACH, NEW TECHNOLOGY GROUP 5: ICD-10-PCS | Performed by: INTERNAL MEDICINE

## 2024-02-09 PROCEDURE — 80053 COMPREHEN METABOLIC PANEL: CPT | Performed by: EMERGENCY MEDICINE

## 2024-02-09 PROCEDURE — 97116 GAIT TRAINING THERAPY: CPT | Mod: GP | Performed by: PHYSICAL THERAPIST

## 2024-02-09 PROCEDURE — 86140 C-REACTIVE PROTEIN: CPT | Performed by: INTERNAL MEDICINE

## 2024-02-09 RX ORDER — ERGOCALCIFEROL (VITAMIN D2) 50 MCG
50 CAPSULE ORAL DAILY
Status: ON HOLD | COMMUNITY
End: 2024-02-13

## 2024-02-09 RX ORDER — ENOXAPARIN SODIUM 100 MG/ML
40 INJECTION SUBCUTANEOUS EVERY 24 HOURS
Status: DISCONTINUED | OUTPATIENT
Start: 2024-02-09 | End: 2024-02-10

## 2024-02-09 RX ORDER — HYDRALAZINE HYDROCHLORIDE 10 MG/1
10 TABLET, FILM COATED ORAL EVERY 4 HOURS PRN
Status: DISCONTINUED | OUTPATIENT
Start: 2024-02-09 | End: 2024-02-13 | Stop reason: HOSPADM

## 2024-02-09 RX ORDER — ACETAMINOPHEN 650 MG/1
650 SUPPOSITORY RECTAL EVERY 4 HOURS PRN
Status: DISCONTINUED | OUTPATIENT
Start: 2024-02-09 | End: 2024-02-13 | Stop reason: HOSPADM

## 2024-02-09 RX ORDER — AMOXICILLIN 250 MG
1 CAPSULE ORAL 2 TIMES DAILY PRN
Status: DISCONTINUED | OUTPATIENT
Start: 2024-02-09 | End: 2024-02-13 | Stop reason: HOSPADM

## 2024-02-09 RX ORDER — ONDANSETRON 4 MG/1
4 TABLET, ORALLY DISINTEGRATING ORAL EVERY 6 HOURS PRN
Status: DISCONTINUED | OUTPATIENT
Start: 2024-02-09 | End: 2024-02-13 | Stop reason: HOSPADM

## 2024-02-09 RX ORDER — ALLOPURINOL 100 MG/1
200 TABLET ORAL DAILY
Status: ON HOLD | COMMUNITY
End: 2024-02-13

## 2024-02-09 RX ORDER — PROCHLORPERAZINE MALEATE 5 MG
5 TABLET ORAL EVERY 6 HOURS PRN
Status: DISCONTINUED | OUTPATIENT
Start: 2024-02-09 | End: 2024-02-13 | Stop reason: HOSPADM

## 2024-02-09 RX ORDER — HYDRALAZINE HYDROCHLORIDE 20 MG/ML
10 INJECTION INTRAMUSCULAR; INTRAVENOUS EVERY 4 HOURS PRN
Status: DISCONTINUED | OUTPATIENT
Start: 2024-02-09 | End: 2024-02-13 | Stop reason: HOSPADM

## 2024-02-09 RX ORDER — ONDANSETRON 2 MG/ML
4 INJECTION INTRAMUSCULAR; INTRAVENOUS EVERY 6 HOURS PRN
Status: DISCONTINUED | OUTPATIENT
Start: 2024-02-09 | End: 2024-02-13 | Stop reason: HOSPADM

## 2024-02-09 RX ORDER — METOPROLOL SUCCINATE 25 MG/1
25 TABLET, EXTENDED RELEASE ORAL EVERY EVENING
Status: ON HOLD | COMMUNITY
End: 2024-02-13

## 2024-02-09 RX ORDER — FAMOTIDINE 20 MG/1
20 TABLET, FILM COATED ORAL 2 TIMES DAILY
Status: DISCONTINUED | OUTPATIENT
Start: 2024-02-09 | End: 2024-02-13 | Stop reason: HOSPADM

## 2024-02-09 RX ORDER — LIDOCAINE 40 MG/G
CREAM TOPICAL
Status: DISCONTINUED | OUTPATIENT
Start: 2024-02-09 | End: 2024-02-13 | Stop reason: HOSPADM

## 2024-02-09 RX ORDER — NICOTINE POLACRILEX 4 MG
15-30 LOZENGE BUCCAL
Status: DISCONTINUED | OUTPATIENT
Start: 2024-02-09 | End: 2024-02-13 | Stop reason: HOSPADM

## 2024-02-09 RX ORDER — AMOXICILLIN 250 MG
2 CAPSULE ORAL 2 TIMES DAILY PRN
Status: DISCONTINUED | OUTPATIENT
Start: 2024-02-09 | End: 2024-02-13 | Stop reason: HOSPADM

## 2024-02-09 RX ORDER — HYDROCHLOROTHIAZIDE 12.5 MG/1
12.5 TABLET ORAL DAILY
Status: ON HOLD | COMMUNITY
End: 2024-02-13

## 2024-02-09 RX ORDER — ACETAMINOPHEN 325 MG/1
650 TABLET ORAL EVERY 4 HOURS PRN
Status: DISCONTINUED | OUTPATIENT
Start: 2024-02-09 | End: 2024-02-13 | Stop reason: HOSPADM

## 2024-02-09 RX ORDER — LISINOPRIL 20 MG/1
20 TABLET ORAL DAILY
Status: ON HOLD | COMMUNITY
End: 2024-02-13

## 2024-02-09 RX ORDER — SODIUM CHLORIDE 9 MG/ML
INJECTION, SOLUTION INTRAVENOUS CONTINUOUS
Status: DISCONTINUED | OUTPATIENT
Start: 2024-02-09 | End: 2024-02-11

## 2024-02-09 RX ORDER — DEXAMETHASONE 2 MG/1
6 TABLET ORAL DAILY
Status: DISCONTINUED | OUTPATIENT
Start: 2024-02-09 | End: 2024-02-13 | Stop reason: HOSPADM

## 2024-02-09 RX ORDER — ALLOPURINOL 100 MG/1
200 TABLET ORAL DAILY
Status: DISCONTINUED | OUTPATIENT
Start: 2024-02-09 | End: 2024-02-13 | Stop reason: HOSPADM

## 2024-02-09 RX ORDER — METOPROLOL SUCCINATE 25 MG/1
50 TABLET, EXTENDED RELEASE ORAL EVERY MORNING
Status: ON HOLD | COMMUNITY
End: 2024-02-13

## 2024-02-09 RX ORDER — DEXTROSE MONOHYDRATE 25 G/50ML
25-50 INJECTION, SOLUTION INTRAVENOUS
Status: DISCONTINUED | OUTPATIENT
Start: 2024-02-09 | End: 2024-02-13 | Stop reason: HOSPADM

## 2024-02-09 RX ORDER — POLYETHYLENE GLYCOL 3350 17 G/17G
17 POWDER, FOR SOLUTION ORAL 2 TIMES DAILY PRN
Status: DISCONTINUED | OUTPATIENT
Start: 2024-02-09 | End: 2024-02-13 | Stop reason: HOSPADM

## 2024-02-09 RX ORDER — PROCHLORPERAZINE 25 MG
12.5 SUPPOSITORY, RECTAL RECTAL EVERY 12 HOURS PRN
Status: DISCONTINUED | OUTPATIENT
Start: 2024-02-09 | End: 2024-02-13 | Stop reason: HOSPADM

## 2024-02-09 RX ORDER — ASPIRIN 81 MG/1
81 TABLET ORAL DAILY
Status: DISCONTINUED | OUTPATIENT
Start: 2024-02-09 | End: 2024-02-13 | Stop reason: HOSPADM

## 2024-02-09 RX ADMIN — SODIUM CHLORIDE 1000 ML: 9 INJECTION, SOLUTION INTRAVENOUS at 00:53

## 2024-02-09 RX ADMIN — ASPIRIN 81 MG: 81 TABLET, COATED ORAL at 16:29

## 2024-02-09 RX ADMIN — ACETAMINOPHEN 650 MG: 325 TABLET, FILM COATED ORAL at 16:29

## 2024-02-09 RX ADMIN — FAMOTIDINE 20 MG: 20 TABLET ORAL at 09:04

## 2024-02-09 RX ADMIN — SODIUM CHLORIDE: 9 INJECTION, SOLUTION INTRAVENOUS at 12:47

## 2024-02-09 RX ADMIN — FAMOTIDINE 20 MG: 20 TABLET ORAL at 22:23

## 2024-02-09 RX ADMIN — SODIUM CHLORIDE: 9 INJECTION, SOLUTION INTRAVENOUS at 20:27

## 2024-02-09 RX ADMIN — CLOSTRIDIUM TETANI TOXOID ANTIGEN (FORMALDEHYDE INACTIVATED), CORYNEBACTERIUM DIPHTHERIAE TOXOID ANTIGEN (FORMALDEHYDE INACTIVATED), BORDETELLA PERTUSSIS TOXOID ANTIGEN (GLUTARALDEHYDE INACTIVATED), BORDETELLA PERTUSSIS FILAMENTOUS HEMAGGLUTININ ANTIGEN (FORMALDEHYDE INACTIVATED), BORDETELLA PERTUSSIS PERTACTIN ANTIGEN, AND BORDETELLA PERTUSSIS FIMBRIAE 2/3 ANTIGEN 0.5 ML: 5; 2; 2.5; 5; 3; 5 INJECTION, SUSPENSION INTRAMUSCULAR at 03:02

## 2024-02-09 RX ADMIN — INSULIN ASPART 1 UNITS: 100 INJECTION, SOLUTION INTRAVENOUS; SUBCUTANEOUS at 17:30

## 2024-02-09 RX ADMIN — ENOXAPARIN SODIUM 40 MG: 40 INJECTION SUBCUTANEOUS at 09:04

## 2024-02-09 RX ADMIN — SODIUM CHLORIDE: 9 INJECTION, SOLUTION INTRAVENOUS at 04:54

## 2024-02-09 RX ADMIN — REMDESIVIR 200 MG: 100 INJECTION, POWDER, LYOPHILIZED, FOR SOLUTION INTRAVENOUS at 06:46

## 2024-02-09 RX ADMIN — DEXAMETHASONE 6 MG: 2 TABLET ORAL at 12:45

## 2024-02-09 RX ADMIN — ALLOPURINOL 200 MG: 100 TABLET ORAL at 16:29

## 2024-02-09 ASSESSMENT — ACTIVITIES OF DAILY LIVING (ADL)
ADLS_ACUITY_SCORE: 32
ADLS_ACUITY_SCORE: 35
PREVIOUS_RESPONSIBILITIES: MEAL PREP;MEDICATION MANAGEMENT;FINANCES
ADLS_ACUITY_SCORE: 32
ADLS_ACUITY_SCORE: 35
ADLS_ACUITY_SCORE: 40
ADLS_ACUITY_SCORE: 35
ADLS_ACUITY_SCORE: 35
ADLS_ACUITY_SCORE: 47
ADLS_ACUITY_SCORE: 35
ADLS_ACUITY_SCORE: 32

## 2024-02-09 NOTE — ED TRIAGE NOTES
Triage Assessment (Adult)       Row Name 02/09/24 0007          Triage Assessment    Airway WDL WDL        Respiratory WDL    Respiratory WDL cough;X     Cough Frequency frequent     Cough Type dry        Skin Circulation/Temperature WDL    Skin Circulation/Temperature WDL X  Laceration to posterioir scalp.        Cardiac WDL    Cardiac WDL WDL        Peripheral/Neurovascular WDL    Peripheral Neurovascular WDL WDL        Cognitive/Neuro/Behavioral WDL    Cognitive/Neuro/Behavioral WDL WDL                   Pt. Brought from home via EMS after son found on floor at home. Last seen yesterday at 1600 2/8. Pt. Is Ukrainian speaking, + posterior head injury with dried blood. No blood thinner use. Son states  pt. Is slightly confused. + cough, VJ=524. Pt. Has no memory of fall. Now + nausea an d dizzy feeling.

## 2024-02-09 NOTE — H&P
[facility]    History and Physical - Hospitalist Service       Date of Admission:  2/8/2024    Assessment & Plan   Leola Anguiano is a 83 year-old female with past medical history including depression, diabetes mellitus type 2, hyperlipidemia, hypertension, obesity, gout who presents with unwitnessed fall, weakness, confusion. Admitted on 2/8/2024.     COVID19 infection   Acute hypoxic respiratory failure   Generalized weakness  Unwitnessed fall   *Symptom onset ~2/1/24 with cough, rhinitis. Subsequently with increased weakness  *Patient found on ground by son AM of 2/8, unsure how long she was on ground, possibly overnight  *COVID19/Influenza/RSV PCR positive for COVID  *CXR with no focal infiltrate   *Head CT, CT cervical spine negative for acute fracture  *Hypoxic to 86% on room air in ED  - Special precautions   - Dexamethasone 6 mg daily for 10 days or hospital discharge  - Remdesivir for 5 days or hospital discharge  - Enoxaparin subcutaneous for DVT prophylaxis   - Oxygen as needed, wean as tolerated  - Famotidine for GI prophylaxis  - Add on CRP  - PT consulted    Acute encephalopathy  Possible concussion  *Per son, patient is typically sharp at baseline and manages all her own ADLs at home  *Since fall, has seemed to be more confused  *Unclear if related to COVID or possible concussion from fall   - OT consulted  - Re-orient as needed  - Maintain normal day/night, sleep wake cycles  - Minimize sedating/altering medications as able  - Treat separate conditions as detailed above/below     Rhabdomyolysis, mild   Prerenal azotemia  *CK 1781, AST 68, likely from prolonged period down  *UA with trace blood, no RBC  *BUN 29, creatinine normal  - Continue IVF  - Repeat CK in AM   - Monitor BMP     Diabetes mellitus, type 2   HgbA1c 6.3% 2/14/23. Prior to admission on metformin (needs med rec).  - Medium sliding scale insulin   - Hold oral medications while hospitalized  - Hypoglycemia  protocol    Mild hyponatremia  Sodium 134. Likely hypovolemic  - IVF as above  - Monitor BMP     Scalp laceration  *Repaired with wound glue in ED.  *Tetanus injection given  - Keep open with no ointment, allow to fall off on own    Hyperlipidemia  - Hold prior to admission statin until CK normalizes    Insomnia  - Hold prior to admission gabapentin until mental status normalizes    Hypertension  - Resume prior to admission lisinopril, metoprolol XL when dose confirmed by pharmacy   - Hold prior to admission hydrochlorothiazide while on IVF     Gout  - Resume prior to admission allopurinol when dose confirmed by pharmacy        Diet:  Moderate consistent carbohydrate diet   DVT Prophylaxis: Enoxaparin (Lovenox) SQ  Marte Catheter: Not present  Code Status:  Full code     Disposition Plan   Admit to inpatient. Anticipate greater than or equal to 2 midnights prior to discharge.     Entered: Jong Dewey MD 02/09/2024, 5:50 AM     The patient's care was discussed with the ED provider, patient and patient's son    Medical Decision Making       80 MINUTES SPENT BY ME on the date of service doing chart review, history, exam, documentation & further activities per the note.      Clinically Significant Risk Factors Present on Admission                # Drug Induced Platelet Defect: home medication list includes an antiplatelet medication                        Jong Dewey MD  [facility]    ______________________________________________________________________    Chief Complaint   Fall, weakness, confusion    History of Present Illness   Leola Anguiano is a 83 year old female who presents with the above chief complaint.    History is obtained from the patient, patient's son, discussion with ED provider and review of medical record.  History from the patient limited by mild confusion. Patient speaks some English, declined phone , son assisting with interpretation.  The patient  "presents after she was found the morning of 2/8 on the ground by her son.  She was bleeding from her scalp and he suspects she may have struck a nearby coffee table.  He noted her to be more confused from her baseline, which is typically \"sharp\" and independent with her ADLs living alone at home. She also complained of some nausea.  He speaks to her every day, visiting about once a week, he spoke to her on 2/8 and she seemed mostly normal, though had complained of some weakness.  She has recently had a cough and rhinorrhea for about 1 week. Denies any fevers.     In the Emergency Department, the patient was seen by Dr. Cruz, with whom I discussed the patient's presenting symptoms and emergency department course.  Initial vital signs were a temperature of 99.8F, HR 85, /56, RR 19, SpO2 96% on 6L nasal cannula. Pertinent work-up as noted in A&P. The patient received 1L normal saline and tetanus shot and Hospitalists were contacted for admission to the hospital.     Past Medical History    I have reviewed this patient's medical history and updated it with pertinent information if needed.   Past Medical History:   Diagnosis Date    Arthritis     Depression     Diabetes mellitus, type 2 (H)     Gout     Hyperlipemia     Hypertension     Obesity     Palpitations     Rotator cuff tear, right     Tachycardia, unspecified     Vitamin B12 deficiency        Past Surgical History   I have reviewed this patient's surgical history and updated it with pertinent information if needed.  Past Surgical History:   Procedure Laterality Date    CHOLECYSTECTOMY      GYN SURGERY      hyst    HEMORRHOIDECTOMY      HERNIA REPAIR      ORTHOPEDIC SURGERY      rotator cuff repair    PHACOEMULSIFICATION CLEAR CORNEA WITH STANDARD INTRAOCULAR LENS IMPLANT Right 11/12/2018    Procedure: RIGHT EYE PHACOEMULSIFICATION CLEAR CORNEA WITH STANDARD INTRAOCULAR LENS IMPLANT ;  Surgeon: Glendale, Marco A CANNON MD;  Location: [facility]         Social History "   I have reviewed this patient's social history and updated it with pertinent information if needed.  Social History     Tobacco Use    Smoking status: Never    Smokeless tobacco: Never   Substance Use Topics    Alcohol use: No    Drug use: No        Prior to Admission Medications  - Pending pharmacy reconciliation   Prior to Admission Medications   Prescriptions Last Dose Informant Patient Reported? Taking?   ACETAMINOPHEN PO   Yes No   Sig: Take 325 mg by mouth every 4 hours as needed for pain   ASPIRIN PO   Yes No   Sig: Take 81 mg by mouth daily   Atorvastatin Calcium (LIPITOR PO)   Yes No   Sig: Take 40 mg by mouth daily   Calcium Carbonate (CALCIUM 600 PO)   Yes No   Sig: Take by mouth 2 times daily   Docusate Sodium (COLACE PO)   Yes No   Sig: Take 100 mg by mouth 2 times daily   IBUPROFEN PO   Yes No   Sig: Take 200 mg by mouth every 6 hours as needed for moderate pain   METFORMIN HCL PO   Yes No   Sig: Take 500 mg by mouth 2 times daily (with meals)   Metoprolol Succinate (TOPROL XL PO)   Yes No   Sig: Take 25 mg by mouth 2 times daily   Omega 3-6-9 Fatty Acids (OMEGA-3 & OMEGA-6 FISH OIL PO)   Yes No   cyanocobalamin (VITAMIN  B-12) 1000 MCG tablet   Yes No   Sig: Take by mouth daily   desonide (DESOWEN) 0.05 % ointment   Yes No   Sig: Apply topically 2 times daily   hydrocortisone (ANUSOL-HC) 2.5 % cream   Yes No   Sig: Place rectally 2 times daily as needed for hemorrhoids      Facility-Administered Medications: None     Allergies   Allergies   Allergen Reactions    Betadine [Povidone Iodine]     Chlorhexidine     Chlorhexidine Gluconate        Physical Exam   Vital Signs: Temp: 99.8  F (37.7  C) Temp src: Temporal BP: 117/68 Pulse: 75   Resp: 20 SpO2: 93 % O2 Device: Nasal cannula Oxygen Delivery: 1 LPM  Weight: 0 lbs 0 oz    Constitutional: NAD  Eyes: PERRL, EOMI  HENT: Superior scalp laceration  Respiratory: Clear to auscultation bilaterally, good air movement, normal effort   Cardiovascular: RRR, no  "m/r/g.   GI: Soft, non-tender, non-distended. No rebound tenderness or guarding.    Skin: Warm, dry   Neurologic: Alert. Oriented to self and \"hospital\" (son reports he did not tell her which hospital he took her too), not able to state date (son reports baseline).  5/5 upper and lower extremity strength symmetric bilaterally.   Psychiatric: Normal affect, appropriate      Data   Data reviewed today: I reviewed all medications, new labs and imaging results over the last 24 hours. I personally reviewed the EKG tracing showing sinus rhythm .    Recent Labs   Lab 02/09/24  0015   WBC 8.0   HGB 11.7   MCV 93      *   POTASSIUM 3.4   CHLORIDE 96*   CO2 22   BUN 28.8*   CR 0.81   ANIONGAP 16*   EMMA 9.0   *   ALBUMIN 3.8   PROTTOTAL 7.2   BILITOTAL 0.3   ALKPHOS 71   ALT 48   AST 68*       Recent Results (from the past 24 hour(s))   XR Chest 2 Views    Narrative    EXAM: XR CHEST 2 VIEWS  LOCATION: [facility]  DATE: 2/9/2024    INDICATION: cough  COMPARISON: None.      Impression    IMPRESSION: Cardiac enlargement. No focal consolidation or significant effusion. Atherosclerotic aorta. Right shoulder arthroplasty. Degenerative change osseous structures.   CT Cervical Spine w/o Contrast    Narrative    EXAM: CT HEAD W/O CONTRAST, CT CERVICAL SPINE W/O CONTRAST  LOCATION: [facility]  DATE: 2/9/2024    INDICATION: fall, head trauma with altered mental status and neck pain.  COMPARISON: None.  TECHNIQUE:   1) Routine CT Head without IV contrast. Multiplanar reformats. Dose reduction techniques were used.  2) Routine CT Cervical Spine without IV contrast. Multiplanar reformats. Dose reduction techniques were used.    FINDINGS:   HEAD CT:   INTRACRANIAL CONTENTS: No intracranial hemorrhage, extraaxial collection, or mass effect.  No CT evidence of acute infarct. There is scattered diffuse low attenuation within the periventricular and subcortical white matter " consistent with diffuse small   vessel ischemic disease. The ventricular system, basal cisterns and the cortical sulci are consistent with volume loss.     VISUALIZED ORBITS/SINUSES/MASTOIDS: No intraorbital abnormality. No paranasal sinus mucosal disease. No middle ear or mastoid effusion.    BONES/SOFT TISSUES: No acute abnormality.    CERVICAL SPINE CT:   VERTEBRA: There is a slight anterior listhesis of C3 in relation to C4 and a mild anterior listhesis of C4 in relation to C5 and C7 in relation to T1. The vertebral body heights are well-maintained throughout. No fracture or posttraumatic subluxation.     CANAL/FORAMINA: There is prominent diffuse degenerative disc disease throughout the cervical sine most prominent at the C5-C6 and the C6-C7 disc space levels with prominent anterior osteophyte formation and posterior osteophyte formation. There is   diffuse facet arthropathy visualized.    At the C3-C4 disc space level there is moderate left neural foraminal narrowing.    At the C4-C5 disc space level there is mild left neural foraminal narrowing.    At the C5-C6 disc space level there is moderate left neural foraminal narrowing.    At the C6-C7 disc space level there is mild canal compromise and mild bilateral neural foraminal narrowing.    At the C7-T1 disc space level there is no significant canal compromise or neural foraminal narrowing.    PARASPINAL: No extraspinal abnormality. Visualized lung fields are clear.      Impression    IMPRESSION:  HEAD CT:  1.  No CT finding of a mass, hemorrhage or focal area suggestive of acute infarct.  2.  Diffuse age related changes.    CERVICAL SPINE CT:  1.  No CT evidence for acute fracture or post traumatic subluxation.  2.  Diffuse degenerative disc disease with prominent facet arthropathy most prominent at the C5-C6 and the C6-C7 disc space levels as described above.   Head CT w/o contrast    Narrative    EXAM: CT HEAD W/O CONTRAST, CT CERVICAL SPINE W/O  CONTRAST  LOCATION: [facility]  DATE: 2/9/2024    INDICATION: fall, head trauma with altered mental status and neck pain.  COMPARISON: None.  TECHNIQUE:   1) Routine CT Head without IV contrast. Multiplanar reformats. Dose reduction techniques were used.  2) Routine CT Cervical Spine without IV contrast. Multiplanar reformats. Dose reduction techniques were used.    FINDINGS:   HEAD CT:   INTRACRANIAL CONTENTS: No intracranial hemorrhage, extraaxial collection, or mass effect.  No CT evidence of acute infarct. There is scattered diffuse low attenuation within the periventricular and subcortical white matter consistent with diffuse small   vessel ischemic disease. The ventricular system, basal cisterns and the cortical sulci are consistent with volume loss.     VISUALIZED ORBITS/SINUSES/MASTOIDS: No intraorbital abnormality. No paranasal sinus mucosal disease. No middle ear or mastoid effusion.    BONES/SOFT TISSUES: No acute abnormality.    CERVICAL SPINE CT:   VERTEBRA: There is a slight anterior listhesis of C3 in relation to C4 and a mild anterior listhesis of C4 in relation to C5 and C7 in relation to T1. The vertebral body heights are well-maintained throughout. No fracture or posttraumatic subluxation.     CANAL/FORAMINA: There is prominent diffuse degenerative disc disease throughout the cervical sine most prominent at the C5-C6 and the C6-C7 disc space levels with prominent anterior osteophyte formation and posterior osteophyte formation. There is   diffuse facet arthropathy visualized.    At the C3-C4 disc space level there is moderate left neural foraminal narrowing.    At the C4-C5 disc space level there is mild left neural foraminal narrowing.    At the C5-C6 disc space level there is moderate left neural foraminal narrowing.    At the C6-C7 disc space level there is mild canal compromise and mild bilateral neural foraminal narrowing.    At the C7-T1 disc space level there is no  significant canal compromise or neural foraminal narrowing.    PARASPINAL: No extraspinal abnormality. Visualized lung fields are clear.      Impression    IMPRESSION:  HEAD CT:  1.  No CT finding of a mass, hemorrhage or focal area suggestive of acute infarct.  2.  Diffuse age related changes.    CERVICAL SPINE CT:  1.  No CT evidence for acute fracture or post traumatic subluxation.  2.  Diffuse degenerative disc disease with prominent facet arthropathy most prominent at the C5-C6 and the C6-C7 disc space levels as described above.

## 2024-02-09 NOTE — PROGRESS NOTES
[facility]    Medicine Progress Note - Hospitalist Service    Date of Admission:  2/8/2024    Assessment & Plan   Leola Anguiano is a 83 year-old female with past medical history including depression, diabetes mellitus type 2, hyperlipidemia, hypertension, obesity, gout who presents with unwitnessed fall, weakness, confusion. Admitted on 2/8/2024.      COVID19 infection   Acute hypoxic respiratory failure   Generalized weakness  Unwitnessed fall   *Symptom onset ~2/1/24 with cough, rhinitis. Subsequently with increased weakness  *Patient found on ground by son AM of 2/8, unsure how long she was on ground, possibly overnight  *COVID19/Influenza/RSV PCR positive for COVID  *CXR with no focal infiltrate   *Head CT, CT cervical spine negative for acute fracture  *Hypoxic to 86% on room air in ED  - Special precautions   - Dexamethasone 6 mg daily for 10 days or hospital discharge  - Remdesivir for 5 days or hospital discharge  - Enoxaparin subcutaneous for DVT prophylaxis   - Oxygen as needed, wean as tolerated  - Famotidine for GI prophylaxis  - PT consulted     Acute encephalopathy  Possible concussion  *Per son, patient is typically sharp at baseline and manages all her own ADLs at home  *Since fall, has seemed to be more confused  *Unclear if related to COVID or possible concussion from fall   - OT consulted  - Re-orient as needed  - Maintain normal day/night, sleep wake cycles  - Minimize sedating/altering medications as able  - Treat separate conditions as detailed above/below   -2/9- patient reports she is feeling improved     Rhabdomyolysis, mild   Prerenal azotemia  *CK 1781, AST 68, likely from prolonged period down  *UA with trace blood, no RBC  *BUN 29, creatinine normal  - Continue IVF  - Repeat CK in AM   - Monitor BMP      Diabetes mellitus, type 2   HgbA1c 6.3% 2/14/23. Prior to admission on metformin (needs med rec).  - Medium sliding scale insulin   - Hold oral medications  while hospitalized  - Hypoglycemia protocol     Mild hyponatremia  Sodium 134. Likely hypovolemic  - IVF as above  - Monitor BMP      Scalp laceration  *Repaired with wound glue in ED.  *Tetanus injection given  - Keep open with no ointment, allow to fall off on own     Hyperlipidemia  - Hold prior to admission statin until CK normalizes     Insomnia  - Hold prior to admission gabapentin until mental status normalizes     Hypertension  - Resume prior to admission lisinopril, metoprolol XL as able once reconciled. BP is soft this morning and hold at this time  - Hold prior to admission hydrochlorothiazide while on IVF      Gout  - Resume prior to admission allopurinol when dose confirmed by pharmacy           Diet: Combination Diet Moderate Consistent Carb (60 g CHO per Meal) Diet    DVT Prophylaxis: Enoxaparin (Lovenox) SQ  Marte Catheter: Not present  Lines: None     Cardiac Monitoring: None  Code Status: Full Code      Clinically Significant Risk Factors Present on Admission                # Drug Induced Platelet Defect: home medication list includes an antiplatelet medication                   Disposition Plan  likely in the next 2 days TCU vs home with home care pending progress     Expected Discharge Date: 02/11/2024                    Malka Martinez MD  Hospitalist Service  [facility]  Securely message with [employer]Leaf (more info)  Text page via [email or web address] Paging/Directory   ______________________________________________________________________    Interval History   Patient declined interpretor and states she can understand/speak english.   She states she feels much better today. Denies nausea or vomiting. Denies abdominal pain.       Physical Exam   Vital Signs: Temp: 97.6  F (36.4  C) Temp src: Oral BP: 100/70 Pulse: 76   Resp: 16 SpO2: 97 % O2 Device: Nasal cannula Oxygen Delivery: 1 LPM  Weight: 0 lbs 0 oz    General Appearance: Alert, awake and no apparent distress  Respiratory:  clear to auscultation bilaterally  Cardiovascular: regular rate and rhythm  GI: soft and non-tender  Skin: warm and dry      Medical Decision Making           Data     I have personally reviewed the following data over the past 24 hrs:    8.0  \   11.7   / 285     134 (L) 96 (L) 28.8 (H) /  89   3.4 22 0.81 \     ALT: 48 AST: 68 (H) AP: 71 TBILI: 0.3   ALB: 3.8 TOT PROTEIN: 7.2 LIPASE: N/A     Procal: N/A CRP: 54.47 (H) Lactic Acid: N/A         Imaging results reviewed over the past 24 hrs:   Recent Results (from the past 24 hour(s))   XR Chest 2 Views    Narrative    EXAM: XR CHEST 2 VIEWS  LOCATION: [facility]  DATE: 2/9/2024    INDICATION: cough  COMPARISON: None.      Impression    IMPRESSION: Cardiac enlargement. No focal consolidation or significant effusion. Atherosclerotic aorta. Right shoulder arthroplasty. Degenerative change osseous structures.   CT Cervical Spine w/o Contrast    Narrative    EXAM: CT HEAD W/O CONTRAST, CT CERVICAL SPINE W/O CONTRAST  LOCATION: [facility]  DATE: 2/9/2024    INDICATION: fall, head trauma with altered mental status and neck pain.  COMPARISON: None.  TECHNIQUE:   1) Routine CT Head without IV contrast. Multiplanar reformats. Dose reduction techniques were used.  2) Routine CT Cervical Spine without IV contrast. Multiplanar reformats. Dose reduction techniques were used.    FINDINGS:   HEAD CT:   INTRACRANIAL CONTENTS: No intracranial hemorrhage, extraaxial collection, or mass effect.  No CT evidence of acute infarct. There is scattered diffuse low attenuation within the periventricular and subcortical white matter consistent with diffuse small   vessel ischemic disease. The ventricular system, basal cisterns and the cortical sulci are consistent with volume loss.     VISUALIZED ORBITS/SINUSES/MASTOIDS: No intraorbital abnormality. No paranasal sinus mucosal disease. No middle ear or mastoid effusion.    BONES/SOFT TISSUES:  No acute abnormality.    CERVICAL SPINE CT:   VERTEBRA: There is a slight anterior listhesis of C3 in relation to C4 and a mild anterior listhesis of C4 in relation to C5 and C7 in relation to T1. The vertebral body heights are well-maintained throughout. No fracture or posttraumatic subluxation.     CANAL/FORAMINA: There is prominent diffuse degenerative disc disease throughout the cervical sine most prominent at the C5-C6 and the C6-C7 disc space levels with prominent anterior osteophyte formation and posterior osteophyte formation. There is   diffuse facet arthropathy visualized.    At the C3-C4 disc space level there is moderate left neural foraminal narrowing.    At the C4-C5 disc space level there is mild left neural foraminal narrowing.    At the C5-C6 disc space level there is moderate left neural foraminal narrowing.    At the C6-C7 disc space level there is mild canal compromise and mild bilateral neural foraminal narrowing.    At the C7-T1 disc space level there is no significant canal compromise or neural foraminal narrowing.    PARASPINAL: No extraspinal abnormality. Visualized lung fields are clear.      Impression    IMPRESSION:  HEAD CT:  1.  No CT finding of a mass, hemorrhage or focal area suggestive of acute infarct.  2.  Diffuse age related changes.    CERVICAL SPINE CT:  1.  No CT evidence for acute fracture or post traumatic subluxation.  2.  Diffuse degenerative disc disease with prominent facet arthropathy most prominent at the C5-C6 and the C6-C7 disc space levels as described above.   Head CT w/o contrast    Narrative    EXAM: CT HEAD W/O CONTRAST, CT CERVICAL SPINE W/O CONTRAST  LOCATION: [facility]  DATE: 2/9/2024    INDICATION: fall, head trauma with altered mental status and neck pain.  COMPARISON: None.  TECHNIQUE:   1) Routine CT Head without IV contrast. Multiplanar reformats. Dose reduction techniques were used.  2) Routine CT Cervical Spine without IV  contrast. Multiplanar reformats. Dose reduction techniques were used.    FINDINGS:   HEAD CT:   INTRACRANIAL CONTENTS: No intracranial hemorrhage, extraaxial collection, or mass effect.  No CT evidence of acute infarct. There is scattered diffuse low attenuation within the periventricular and subcortical white matter consistent with diffuse small   vessel ischemic disease. The ventricular system, basal cisterns and the cortical sulci are consistent with volume loss.     VISUALIZED ORBITS/SINUSES/MASTOIDS: No intraorbital abnormality. No paranasal sinus mucosal disease. No middle ear or mastoid effusion.    BONES/SOFT TISSUES: No acute abnormality.    CERVICAL SPINE CT:   VERTEBRA: There is a slight anterior listhesis of C3 in relation to C4 and a mild anterior listhesis of C4 in relation to C5 and C7 in relation to T1. The vertebral body heights are well-maintained throughout. No fracture or posttraumatic subluxation.     CANAL/FORAMINA: There is prominent diffuse degenerative disc disease throughout the cervical sine most prominent at the C5-C6 and the C6-C7 disc space levels with prominent anterior osteophyte formation and posterior osteophyte formation. There is   diffuse facet arthropathy visualized.    At the C3-C4 disc space level there is moderate left neural foraminal narrowing.    At the C4-C5 disc space level there is mild left neural foraminal narrowing.    At the C5-C6 disc space level there is moderate left neural foraminal narrowing.    At the C6-C7 disc space level there is mild canal compromise and mild bilateral neural foraminal narrowing.    At the C7-T1 disc space level there is no significant canal compromise or neural foraminal narrowing.    PARASPINAL: No extraspinal abnormality. Visualized lung fields are clear.      Impression    IMPRESSION:  HEAD CT:  1.  No CT finding of a mass, hemorrhage or focal area suggestive of acute infarct.  2.  Diffuse age related changes.    CERVICAL SPINE CT:  1.   No CT evidence for acute fracture or post traumatic subluxation.  2.  Diffuse degenerative disc disease with prominent facet arthropathy most prominent at the C5-C6 and the C6-C7 disc space levels as described above.

## 2024-02-09 NOTE — ED PROVIDER NOTES
History     Chief Complaint:  Fall      The history is provided by the patient and a relative.      Leola Anguiano is a 83 year old female who presents with a fall. The patient reports that she has no pain currently and does not remember how and when she fell. The patient's son reports that he found her on the floor this morning, laying on her side around 1000 or 1100 when we visited her. He believes she fell down around 1700 yesterday. He adds that he thinks she fell backwards onto the floor and notes that he found some blood on the coffee table and blood streaking down her face. She was unable to walk without support and is nauseous.  Has a new cough.  Has had a cough for almost a week now.    Independent Historian:   Son - They report as mentioned above.    Medications:    Atorvastatin calcium  Cyanocobalamin  Docusate sodium  Metformin HCL  Metoprolol succinate  Sennosides  Gabapentin  Aspirin  Hydrochlorothiazide  Allopurinol  Lisinopril    Past Medical History:    Arthritis  Diabetes mellitus  Hyperlipemia  Hypertension  Obese  Palpitations  Rotator cuff tear, right  Tachycardia, unspecified  Vitamin B12 deficiency  Depression, recurrent  Generalized osteoarthrosis, involving multiple sites  Tachycardia  Nipple lesion    Past Surgical History:    Cholecystectomy  Hysterectomy  Hemorrhoidectomy  Hernia repair  Orthopedic surgery  Phacoemulsification clear cornea with standard intraocular lens implant  Bilateral oophorectomy    Physical Exam   Patient Vitals for the past 24 hrs:   BP Temp Temp src Pulse Resp SpO2   02/09/24 0006 113/56 99.8  F (37.7  C) Temporal 89 18 98 %        Physical Exam  General: Sitting up in bed  Eyes:  The pupils are equal and round    Conjunctivae and sclerae are normal  ENT:    Atraumatic face  Neck:  Normal range of motion  CV:  Regular rate, regular rhythm     Skin warm and well perfused   Resp:  Non labored breathing on room air    No tachypnea    Cough heard  occasionally    Lungs clear bilaterally    On oxygen via nasal cannula  GI:  Abdomen is soft, there is no rigidity    No distension    No rebound tenderness     No abdominal tenderness  MS:  No midline cervical, thoracic or lumbar spine tenderness. Non tender pelvis, UE/LE  Skin:  Laceration on top of scalp   Neuro:   Awake, alert.      Speech is normal and fluent.    Face is symmetric.     Moves all extremities equally  Psych: Normal affect.  Appropriate interactions.    Emergency Department Course   ECG  ECG taken at 1006, ECG read at 1010  Normal sinus rhythm  Left axis deviation  Inferior infarct, age undetermined  Possible anterior infarct, age undetermined  Abnormal ECG      No difference as compared to prior, dated 7/10/18.  Rate 79 bpm. MD interval 170 ms. QRS duration 96 ms. QT/QTc 376/431 ms. P-R-T axes 31 -31 10.     Imaging:  Head CT w/o contrast   Final Result   IMPRESSION:   HEAD CT:   1.  No CT finding of a mass, hemorrhage or focal area suggestive of acute infarct.   2.  Diffuse age related changes.      CERVICAL SPINE CT:   1.  No CT evidence for acute fracture or post traumatic subluxation.   2.  Diffuse degenerative disc disease with prominent facet arthropathy most prominent at the C5-C6 and the C6-C7 disc space levels as described above.      CT Cervical Spine w/o Contrast   Final Result   IMPRESSION:   HEAD CT:   1.  No CT finding of a mass, hemorrhage or focal area suggestive of acute infarct.   2.  Diffuse age related changes.      CERVICAL SPINE CT:   1.  No CT evidence for acute fracture or post traumatic subluxation.   2.  Diffuse degenerative disc disease with prominent facet arthropathy most prominent at the C5-C6 and the C6-C7 disc space levels as described above.      XR Chest 2 Views   Final Result   IMPRESSION: Cardiac enlargement. No focal consolidation or significant effusion. Atherosclerotic aorta. Right shoulder arthroplasty. Degenerative change osseous structures.          Laboratory:  Labs Ordered and Resulted from Time of ED Arrival to Time of ED Departure   COMPREHENSIVE METABOLIC PANEL - Abnormal       Result Value    Sodium 134 (*)     Potassium 3.4      Carbon Dioxide (CO2) 22      Anion Gap 16 (*)     Urea Nitrogen 28.8 (*)     Creatinine 0.81      GFR Estimate 72      Calcium 9.0      Chloride 96 (*)     Glucose 121 (*)     Alkaline Phosphatase 71      AST 68 (*)     ALT 48      Protein Total 7.2      Albumin 3.8      Bilirubin Total 0.3     CBC WITH PLATELETS AND DIFFERENTIAL - Abnormal    WBC Count 8.0      RBC Count 3.78 (*)     Hemoglobin 11.7      Hematocrit 35.2      MCV 93      MCH 31.0      MCHC 33.2      RDW 14.8      Platelet Count 285      % Neutrophils 67      % Lymphocytes 16      % Monocytes 16      % Eosinophils 0      % Basophils 0      % Immature Granulocytes 1      NRBCs per 100 WBC 0      Absolute Neutrophils 5.4      Absolute Lymphocytes 1.3      Absolute Monocytes 1.3      Absolute Eosinophils 0.0      Absolute Basophils 0.0      Absolute Immature Granulocytes 0.1      Absolute NRBCs 0.0     INFLUENZA A/B, RSV, & SARS-COV2 PCR - Abnormal    Influenza A PCR Negative      Influenza B PCR Negative      RSV PCR Negative      SARS CoV2 PCR Positive (*)    CK TOTAL - Abnormal    CK 1,781 (*)    ROUTINE UA WITH MICROSCOPIC REFLEX TO CULTURE - Abnormal    Color Urine Yellow      Appearance Urine Clear      Glucose Urine Negative      Bilirubin Urine Negative      Ketones Urine Trace (*)     Specific Gravity Urine 1.026      Blood Urine Trace (*)     pH Urine 5.5      Protein Albumin Urine 10 (*)     Urobilinogen Urine Normal      Nitrite Urine Negative      Leukocyte Esterase Urine Negative      Mucus Urine Present (*)     RBC Urine <1      WBC Urine <1      Squamous Epithelials Urine <1     MAGNESIUM - Normal    Magnesium 1.8        Emergency Department Course & Assessments:    Interventions:  Medications   sodium chloride 0.9% BOLUS 1,000 mL (1,000 mLs  Intravenous $New Bag 2/9/24 0053)      Independent Interpretation (X-rays, CTs, rhythm strip):  I independently reviewed CT head -no acute hemorrhage seen    Assessments/Consultations/Discussion of Management or Tests:   Discussed patient with hospitalist Dr. Dewey for admission    Social Determinants of Health affecting care:   None    Disposition:  The patient was admitted to the hospital under the care of Dr. Dewey    Impression & Plan      Medical Decision Making:  Leola Anguiano is a 83-year-old female who presented to the emergency department after a fall.  Unclear exactly why she fell as she does not remember why she fell.  She is mildly hypoxic here in the emergency department.  Did test positive for COVID.  This is likely cause of her cough and weakness.  No pneumonia on x-ray.  Imaging of her head is unremarkable.  Laceration on the top of her scalp was repaired with glue.  Initially did not seem to need repair but then slightly opened after nurse irrigated a second time.  Tetanus was updated.  CK is mildly elevated due to laying on the ground for unknown amount of time.  Patient denies any pain and does not seem to have injured anything from the fall.  Discussed patient with hospitalist for admission due to weakness, mild hypoxia and mild rhabdomyolysis.    Diagnosis:    ICD-10-CM    1. COVID-19 virus infection  U07.1       2. Generalized weakness  R53.1       3. Elevated CK  R74.8       4. Acute respiratory failure with hypoxia (H)  J96.01       5. Fall, initial encounter  W19.XXXA          Scribe Disclosure:  Cholo DEWEY, am serving as a scribe at 12:24 AM on 2/9/2024 to document services personally performed by Harriett Cruz MD based on my observations and the provider's statements to me.     2/9/2024   Harriett Cruz MD Goertz, Maria Kristine, MD  02/09/24 0685

## 2024-02-09 NOTE — PROGRESS NOTES
Pt arrived on the floor from ED. Brazilian speaking. A/O. VSS On 1L NC. Scalp laceration OLEG. NS infusing @ 125ml/hr. Denies pain, SOB. Plan of care ongoing.    Addendum: Pt O2 sat de-sating while asleep. Oxy increased to 2L with good saturation 97%.

## 2024-02-09 NOTE — ED NOTES
Bed: ED25  Expected date: 2/8/24  Expected time: 11:50 PM  Means of arrival: Ambulance  Comments:  HEMS [id number] unwitnessed fall; altered

## 2024-02-09 NOTE — PROGRESS NOTES
Date/Time: 2/9/24 @ [id number]    Diagnosis: Unwitnessed fall and AMS  Mental Status: AOx4  Activity/dangle: A1 GB/W  Diet: Mod Carb  Pain:denies  Irwin/Voiding:no irwin, voiding  Tele/Restraints/Iso:COVID-Special Precautions  02/LDA: 2L O2; NS infusing 125 mL/hr  D/C Date:TBD  Other Info:  [place]gent[place] Speaking    Scalp Wound

## 2024-02-09 NOTE — PROGRESS NOTES
02/09/24 0900   Appointment Info   Signing Clinician's Name / Credentials (PT) Chandra Ross DPT       Present yes   Language Welsh   Living Environment   People in Home alone   Current Living Arrangements independent living facility   Home Accessibility no concerns   Transportation Anticipated family or friend will provide   Living Environment Comments Pt reports living alone in ILF. No stairs. Pt reports her son will pick her up upon discharge. Pt unclear of level of assist pt's son can provide for her.   Self-Care   Usual Activity Tolerance moderate   Current Activity Tolerance fair   Regular Exercise No   Equipment Currently Used at Home walker, rolling   Fall history within last six months yes   Number of times patient has fallen within last six months 1   Activity/Exercise/Self-Care Comment Pt reports being IND at baseline with all ADLs. Pt ambulates w/ a FWW at baseline.   General Information   Onset of Illness/Injury or Date of Surgery 02/09/24   Referring Physician Jong Dewey MD   Patient/Family Therapy Goals Statement (PT) Pt did not state   Pertinent History of Current Problem (include personal factors and/or comorbidities that impact the POC) Per Chart: Leola Anguiano is a 83 year-old female with past medical history including depression, diabetes mellitus type 2, hyperlipidemia, hypertension, obesity, gout who presents with unwitnessed fall, weakness, confusion. Admitted on 2/8/2024.   Existing Precautions/Restrictions fall   Weight-Bearing Status - LLE full weight-bearing   Weight-Bearing Status - RLE full weight-bearing   Cognition   Orientation Status (Cognition) oriented x 3   Pain Assessment   Patient Currently in Pain No   Integumentary/Edema   Integumentary/Edema Comments Cut on top of head   Posture    Posture Forward head position;Protracted shoulders   Range of Motion (ROM)   Range of Motion ROM is WFL   Strength (Manual Muscle Testing)   Strength  (Manual Muscle Testing) Deficits observed during functional mobility   Strength Comments BLE Hip Flexion: 3/5   Bed Mobility   Comment, (Bed Mobility) Supine>sit w/ CGA   Transfers   Comment, (Transfers) Sit>stand w/ FWW and CGA   Gait/Stairs (Locomotion)   [place] Level (Gait) contact guard   Assistive Device (Gait) walker, front-wheeled   Distance in Feet (Gait) 5'   Balance   Balance Comments Adequate static sitting balance; mild unsteadiness with OOB activity   Sensory Examination   Sensory Perception patient reports no sensory changes   Clinical Impression   Criteria for Skilled Therapeutic Intervention Yes, treatment indicated   PT Diagnosis (PT) Impaired gait   Influenced by the following impairments Decreased activity tolerance; decreased balance; decreased strength   Functional limitations due to impairments Impaired functional mobilitys   Clinical Presentation (PT Evaluation Complexity) stable   Clinical Presentation Rationale Clinical judgement   Clinical Decision Making (Complexity) low complexity   Planned Therapy Interventions (PT) balance training;bed mobility training;gait training;patient/family education;strengthening;transfer training;progressive activity/exercise   Risk & Benefits of therapy have been explained evaluation/treatment results reviewed;care plan/treatment goals reviewed;risks/benefits reviewed;current/potential barriers reviewed;participants voiced agreement with care plan;participants included;patient   PT Total Evaluation Time   PT Eval, Low Complexity Minutes (93132) 10   Physical Therapy Goals   PT Frequency 5x/week   PT Predicted Duration/Target Date for Goal Attainment 02/14/24   PT Goals Bed Mobility;Transfers;Gait   PT: Bed Mobility Independent;Supine to/from sit   PT: Transfers Modified independent;Sit to/from stand;Assistive device   PT: Gait Modified independent;Assistive device;100 feet   Interventions   Interventions Quick Adds Gait Training;Therapeutic Activity    Therapeutic Activity   Therapeutic Activities: dynamic activities to improve functional performance Minutes (55144) 15   Symptoms Noted During/After Treatment Fatigue;Shortness of breath   Treatment Detail/Skilled Intervention Greeted pt supine in bed, agreed to PT. Pt on 2L O2 via NC, desating to ~89% SpO2 with activity. Pt performing all movement slowly due to fatigue. Pt performed supine>sit w/ CGA. Once in sitting, pt able to scoot self to EOB and sit unsupported without LOB but with increased difficulty. Pt performed sit>stand x 4 w/ FWW and CGA, verbal cues for hand placement. After ambulation, pt returned to supine w/ min A x 1, needing assist to safely lift BLEs back into bed and reposition. Pt ended session supine in bed, with all needs met and call light within reach.   Gait Training   Gait Training Minutes (57487) 10   Symptoms Noted During/After Treatment (Gait Training) fatigue;shortness of breath   Treatment Detail/Skilled Intervention Pt ambulated w/ FWW and CGA. Pt ambulated with decreased gait speed, downward gaze, decreased step length, and heavy reliance on FWW. Verbal cues for upright gaze and posture, to increase step length, to reduce reliance on FWW, and pacing. Pt required 2 standing rest breaks due to fatigue. Unable to ambulate further due to fatigue. Pt was mildly unsteady but no overt LOB noted.   Distance in Feet 20'   PT Discharge Planning   PT Plan Bed mobility; repeat sit>stands for strengthening; progress gait w/ FWW; monitor O2   PT Discharge Recommendation (DC Rec) Transitional Care Facility;home with home care physical therapy;home with assist   PT Rationale for DC Rec Pt is below baseline. Pt currently requiring assist with all functional mobility. Pt presents with deficits in activity tolerance, balance, strength, and O2 needs. Due to these deficits, pt is a falls risk and unsafe to be home alone. Unclear of level of assist pt's son can provide. If pt's son can provide 24/7  assist anticipate pt will be able to return back to ILF w/ HHPT. If this level of assist is not available, recommend TCU to address deficits and improve IND with safety and functional mobility.   PT Brief overview of current status A x 1 w/ FWW   Total Session Time   Timed Code Treatment Minutes 25   Total Session Time (sum of timed and untimed services) 35

## 2024-02-09 NOTE — PROGRESS NOTES
02/09/24 1440   Appointment Info   Signing Clinician's Name / Credentials (OT) Musa Shen OTR/L       Present yes  (Son)   Language [place]ian   Living Environment   People in Home alone   Current Living Arrangements independent living facility   Home Accessibility no concerns   Transportation Anticipated family or friend will provide   Living Environment Comments Pt reports living alone in ILF. No concerns. Tub shower w/ shower chair and grab bars.   Self-Care   Usual Activity Tolerance moderate   Current Activity Tolerance fair   Equipment Currently Used at Home walker, rolling   Fall history within last six months yes   Number of times patient has fallen within last six months 1   Activity/Exercise/Self-Care Comment Pt reports being IND w/ all ADL's at baseline prior to admission.   Instrumental Activities of Daily Living (IADL)   Previous Responsibilities meal prep;medication management;finances   IADL Comments Pt's son reports they will have assist w/ housekeeping and laundry upon discharge. Pt does not drive.   General Information   Onset of Illness/Injury or Date of Surgery 02/08/24   Referring Physician Jong Dewey MD   Patient/Family Therapy Goal Statement (OT) None stated   Additional Occupational Profile Info/Pertinent History of Current Problem Leola Anguiano is a 83 year-old female with past medical history including depression, diabetes mellitus type 2, hyperlipidemia, hypertension, obesity, gout who presents with unwitnessed fall, weakness, confusion. Admitted on 2/8/2024.   Existing Precautions/Restrictions fall   Cognitive Status Examination   Orientation Status person;place   Cognitive Screens/Assessments   Cognitive Assessments Completed [facility] Mental Status Exam (UMS):  Total Score out of /30 9/30   [facility] Norms 1-20 equals dementia   [facility] Domains assessed: orientation, memory, attention, executive functions   SLUMS Interpretation Pt  scored a 9/30 on the SLUMS assessment. A score of this number indicates significant cognitive decline. Pt noted w/ deficits in all domains including orientation, memory, attention and executive function. Of note, pt requiring  d/t language barrier which could of impacted scoring on assessment.   Visual Perception   Visual Impairment/Limitations WFL   Sensory   Sensory Quick Adds sensation intact   Pain Assessment   Patient Currently in Pain No   Range of Motion Comprehensive   General Range of Motion no range of motion deficits identified   Strength Comprehensive (MMT)   Comment, General Manual Muscle Testing (MMT) Assessment Generalized weakness bilaterally   Bed Mobility   Bed Mobility supine-sit   Supine-Sit [place] (Bed Mobility) minimum assist (75% patient effort)   Assistive Device (Bed Mobility) bed rails   Transfers   Transfers sit-stand transfer;toilet transfer   Sit-Stand Transfer   Sit-Stand [place] (Transfers) contact guard   Assistive Device (Sit-Stand Transfers) walker, front-wheeled   Toilet Transfer   Toilet Transfer Comments Not assessed this date   Activities of Daily Living   BADL Assessment/Intervention lower body dressing;grooming;toileting   Lower Body Dressing Assessment/Training   Comment, (Lower Body Dressing) Per clinical judgement   [place] Level (Lower Body Dressing) minimum assist (75% patient effort)   Grooming Assessment/Training   [place] Level (Grooming) contact guard assist   Comment, (Grooming) Per clinical judgement   Toileting   Comment, (Toileting) Per clinical judgement   [place] Level (Toileting) contact guard assist   Clinical Impression   Criteria for Skilled Therapeutic Interventions Met (OT) Yes, treatment indicated   OT Diagnosis Decreased ADL independence and activity tolerance   Influenced by the following impairments Decreased ADL independence and cognition   OT Problem List-Impairments impacting ADL problems related to;activity  tolerance impaired;cognition;strength   Assessment of Occupational Performance 3-5 Performance Deficits   Identified Performance Deficits TB dressing, home mgmt, toilet transfer, bathing   Planned Therapy Interventions (OT) ADL retraining;IADL retraining;cognition;strengthening;home program guidelines;progressive activity/exercise;risk factor education   Clinical Decision Making Complexity (OT) problem focused assessment/low complexity   Risk & Benefits of therapy have been explained evaluation/treatment results reviewed;care plan/treatment goals reviewed;risks/benefits reviewed;current/potential barriers reviewed;patient   OT Total Evaluation Time   OT Eval, Low Complexity Minutes (77436) 10   OT Goals   Therapy Frequency (OT) 5 times/week   OT Predicted Duration/Target Date for Goal Attainment 02/15/24   OT Goals Hygiene/Grooming;Lower Body Dressing;Toilet Transfer/Toileting;Cognition   OT: Hygiene/Grooming modified independent;while standing   OT: Lower Body Dressing Modified independent;using adaptive equipment;including set-up/clothing retrieval   OT: Toilet Transfer/Toileting Modified independent;toilet transfer;cleaning and garment management   OT: Cognitive Patient/caregiver will verbalize understanding of cognitive assessment results/recommendations as needed for safe discharge planning   Interventions   Interventions Quick Adds Therapeutic Activity   Self-Care/Home Management   Self-Care/Home Mgmt/ADL, Compensatory, Meal Prep Minutes (41025) 12   Treatment Detail/Skilled Intervention Completed SLUMS assessment (9/30). Please see above for details. Pt requiring  d/t language barrier which may impact the accuracy of score. Educated both pt and pt's son regarding impact of score on pt's independence w/ completing I/ADL's. Pt verbalized understanding and had no questions or concerns at this time.   Therapeutic Activities   Therapeutic Activity Minutes (53645) 10   Symptoms noted during/after  treatment fatigue   Treatment Detail/Skilled Intervention Pt greeted supine in bed w/ son present in room; agreeable for OT evaluation. Pt on 2 L of O2 via NC throughout therapy session. Son interpreted throughout therapy session. Sup > sit EOB w/ Min A and head of bed elevated and use of bed rail; TC's needed for safe hand placement. Pt able to scoot self forward towards EOB. CGA sit > stand edge of bed w/ use of FWW and VC's to push up off of the bed to safely stand. Pt declining to ambulate to bathroom d/t fatigue. Pt ambulated w/ slow gait speed to bedside chair w/ CGA; cues for safe walker management. CGA to help control decent down to chair. Pt able to scoot back in chair w/ increased time. Pt remained seated in chair at end of therapy session w/ food tray and son present in room. All needs met.   OT Discharge Planning   OT Plan Progress mobility to bathroom for toilet transfer and g/h sinkside, further cog screening   OT Discharge Recommendation (DC Rec) Transitional Care Facility;home with home care occupational therapy   OT Rationale for DC Rec Pt is currently functioning below baseline d/t decreased activity tolerance and cognition. Pt from ILF and reports being IND w/ all ADL's at baseline. Pt is currently requiring assist w/ all mobilty and noting decreased cognition (9/30 on SLUMS); language barrier may impact scoring. At this time, pt unable to receive 24/7 assist and would benefit from continued rehab at TCU prior to returning home.   OT Brief overview of current status See above   Total Session Time   Timed Code Treatment Minutes 22   Total Session Time (sum of timed and untimed services) 32

## 2024-02-09 NOTE — ED NOTES
[facility]  ED Nurse Handoff Report    ED Chief complaint: Fall (Pt. Brought from home via EMS after son found on floor at home. Last seen yesterday at 1600 2/8. Pt. Is [place]an speaking, + posterior head injury with dried blood. No blood thinner use. Son states  pt. Is slightly confused. + cough, GW=093. Pt. Has no memory of fall. Now + nausea an d dizzy feeling. )      ED Diagnosis:   Final diagnoses:   None       Code Status: to be addressed    Allergies:   Allergies   Allergen Reactions    Betadine [Povidone Iodine]     Chlorhexidine     Chlorhexidine Gluconate        Patient Story: presented today by EMS due to fall ,unable to remember when and how it happened,son found her lying on the floor and noted with dry blood on her scalp.  Focused Assessment:  [place]an speaking-desaturates on room air especially when she fall asleep,not distress,vitally stable,looks drowsy and confused,with superficial small scalp laceration noted after cleaning with saline and wound cleanser.  Has elevated CK    Treatments and/or interventions provided: iv access,labs,cxr,CT,see MAR,in and out straight cath  Patient's response to treatments and/or interventions: tolerated    To be done/followed up on inpatient unit:  as per admission order.    Does this patient have any cognitive concerns?:  Prior her fall -alert,oriented,lives alone.    Activity level - Baseline/Home:  Independent  Activity Level - Current:    confined to bed-appears weak    Patient's Preferred language: Russian   Needed?: Yes    Isolation: covid positive  Infection:   COVID r/o and special precautions  Patient tested for COVID 19 prior to admission: YES  Bariatric?: No    Vital Signs:   Vitals:    02/09/24 0006 02/09/24 0032 02/09/24 0127 02/09/24 0147   BP: 113/56 123/53  129/57   Pulse: 89 83  74   Resp: 18  19    Temp: 99.8  F (37.7  C)      TempSrc: Temporal      SpO2: 98% 96% (!) 86% 94%       Cardiac Rhythm:     Was the PSS-3  completed:   Yes  What interventions are required if any?               Family Comments: updated of plan  OBS brochure/video discussed/provided to patient/family: No              Name of person given brochure if not patient:               Relationship to patient:     For the majority of the shift this patient's behavior was Green.   Behavioral interventions performed were none.    ED NURSE PHONE NUMBER: [phone]

## 2024-02-09 NOTE — PROGRESS NOTES
RECEIVING UNIT ED HANDOFF REVIEW    ED Nurse Handoff Report was reviewed by: An Henry RN on February 9, 2024 at 4:20 AM

## 2024-02-09 NOTE — ED NOTES
Tried to wean off her oxygen but she desaturated to 89% especially when she fall asleep.  O2 maintained at 2l/min.  Straight cath inserted under aseptic technique done and obtained sample and sent-300 ml yellow colored output obtained.

## 2024-02-09 NOTE — PLAN OF CARE
Goal Outcome Evaluation:    Pt here with fall, weakness and covid. A&Ox4. Libyan speaking. Covid +, precuations maintained. VSS on 1L NC. Mod carb diet, thin liquids. BS checks, sliding scale. Takes pills whole. Up with Ax GB/W. Denies pain. Continue plan of care. Son talked in depth today about plan, answered questions about plan of care, medications, rehab, covid, and discharge (at least a couple more days here in hospital).

## 2024-02-10 LAB
ANION GAP SERPL CALCULATED.3IONS-SCNC: 8 MMOL/L (ref 7–15)
BUN SERPL-MCNC: 22 MG/DL (ref 8–23)
CALCIUM SERPL-MCNC: 8.1 MG/DL (ref 8.8–10.2)
CHLORIDE SERPL-SCNC: 110 MMOL/L (ref 98–107)
CK SERPL-CCNC: 1108 U/L (ref 26–192)
CREAT SERPL-MCNC: 0.62 MG/DL (ref 0.51–0.95)
DEPRECATED HCO3 PLAS-SCNC: 21 MMOL/L (ref 22–29)
EGFRCR SERPLBLD CKD-EPI 2021: 88 ML/MIN/1.73M2
ERYTHROCYTE [DISTWIDTH] IN BLOOD BY AUTOMATED COUNT: 15 % (ref 10–15)
GLUCOSE BLDC GLUCOMTR-MCNC: 100 MG/DL (ref 70–99)
GLUCOSE BLDC GLUCOMTR-MCNC: 126 MG/DL (ref 70–99)
GLUCOSE BLDC GLUCOMTR-MCNC: 134 MG/DL (ref 70–99)
GLUCOSE BLDC GLUCOMTR-MCNC: 155 MG/DL (ref 70–99)
GLUCOSE BLDC GLUCOMTR-MCNC: 157 MG/DL (ref 70–99)
GLUCOSE SERPL-MCNC: 137 MG/DL (ref 70–99)
HCT VFR BLD AUTO: 31.9 % (ref 35–47)
HGB BLD-MCNC: 10.3 G/DL (ref 11.7–15.7)
MCH RBC QN AUTO: 30.7 PG (ref 26.5–33)
MCHC RBC AUTO-ENTMCNC: 32.3 G/DL (ref 31.5–36.5)
MCV RBC AUTO: 95 FL (ref 78–100)
PLATELET # BLD AUTO: 221 10E3/UL (ref 150–450)
POTASSIUM SERPL-SCNC: 4.1 MMOL/L (ref 3.4–5.3)
RBC # BLD AUTO: 3.35 10E6/UL (ref 3.8–5.2)
SODIUM SERPL-SCNC: 139 MMOL/L (ref 135–145)
WBC # BLD AUTO: 4.2 10E3/UL (ref 4–11)

## 2024-02-10 PROCEDURE — 250N000011 HC RX IP 250 OP 636: Performed by: HOSPITALIST

## 2024-02-10 PROCEDURE — 250N000012 HC RX MED GY IP 250 OP 636 PS 637: Performed by: INTERNAL MEDICINE

## 2024-02-10 PROCEDURE — 258N000003 HC RX IP 258 OP 636: Performed by: INTERNAL MEDICINE

## 2024-02-10 PROCEDURE — 85027 COMPLETE CBC AUTOMATED: CPT | Performed by: INTERNAL MEDICINE

## 2024-02-10 PROCEDURE — 120N000001 HC R&B MED SURG/OB

## 2024-02-10 PROCEDURE — 80048 BASIC METABOLIC PNL TOTAL CA: CPT | Performed by: INTERNAL MEDICINE

## 2024-02-10 PROCEDURE — 250N000013 HC RX MED GY IP 250 OP 250 PS 637: Performed by: INTERNAL MEDICINE

## 2024-02-10 PROCEDURE — 36415 COLL VENOUS BLD VENIPUNCTURE: CPT | Performed by: INTERNAL MEDICINE

## 2024-02-10 PROCEDURE — 82550 ASSAY OF CK (CPK): CPT | Performed by: INTERNAL MEDICINE

## 2024-02-10 PROCEDURE — 258N000003 HC RX IP 258 OP 636: Performed by: HOSPITALIST

## 2024-02-10 PROCEDURE — 250N000011 HC RX IP 250 OP 636: Performed by: INTERNAL MEDICINE

## 2024-02-10 PROCEDURE — 99232 SBSQ HOSP IP/OBS MODERATE 35: CPT | Performed by: HOSPITALIST

## 2024-02-10 RX ORDER — ENOXAPARIN SODIUM 100 MG/ML
40 INJECTION SUBCUTANEOUS EVERY 12 HOURS
Status: DISCONTINUED | OUTPATIENT
Start: 2024-02-10 | End: 2024-02-13 | Stop reason: HOSPADM

## 2024-02-10 RX ADMIN — ALLOPURINOL 200 MG: 100 TABLET ORAL at 15:56

## 2024-02-10 RX ADMIN — ENOXAPARIN SODIUM 40 MG: 40 INJECTION SUBCUTANEOUS at 21:00

## 2024-02-10 RX ADMIN — REMDESIVIR 100 MG: 100 INJECTION, POWDER, LYOPHILIZED, FOR SOLUTION INTRAVENOUS at 09:22

## 2024-02-10 RX ADMIN — SODIUM CHLORIDE: 9 INJECTION, SOLUTION INTRAVENOUS at 03:25

## 2024-02-10 RX ADMIN — ENOXAPARIN SODIUM 40 MG: 40 INJECTION SUBCUTANEOUS at 09:14

## 2024-02-10 RX ADMIN — FAMOTIDINE 20 MG: 20 TABLET ORAL at 20:57

## 2024-02-10 RX ADMIN — SODIUM CHLORIDE 50 ML: 9 INJECTION, SOLUTION INTRAVENOUS at 10:00

## 2024-02-10 RX ADMIN — ASPIRIN 81 MG: 81 TABLET, COATED ORAL at 09:12

## 2024-02-10 RX ADMIN — SODIUM CHLORIDE: 9 INJECTION, SOLUTION INTRAVENOUS at 21:34

## 2024-02-10 RX ADMIN — FAMOTIDINE 20 MG: 20 TABLET ORAL at 09:12

## 2024-02-10 RX ADMIN — DEXAMETHASONE 6 MG: 2 TABLET ORAL at 12:52

## 2024-02-10 ASSESSMENT — ACTIVITIES OF DAILY LIVING (ADL)
ADLS_ACUITY_SCORE: 32
ADLS_ACUITY_SCORE: 33
ADLS_ACUITY_SCORE: 32

## 2024-02-10 NOTE — PROGRESS NOTES
Patient vital signs are at baseline:yes    Patient able to ambulate as they were prior to admission or with assist devices provided by therapies during their stay:yes A1GBW  Patient MUST void prior to discharge:yes    Patient able to tolerate oral intake:yes    Pain has adequate pain control using Oral analgesics: Denies    Does patient have an identified :yes    Has goal D/C date and time been discussed with patient: excepted  discharge TBD.       AOX4, VSS. COVID +. Diabetic, AC HS blood sugars.  Weaned off of O2 this shift. Pt satting 94% on RA. Italian speaking pt speaks basic english,  son visiting and assisted with translation,  Continuous NS 75 ml/hr rates.

## 2024-02-10 NOTE — PROGRESS NOTES
Patient vital signs are at baseline:yes    Patient able to ambulate as they were prior to admission or with assist devices provided by therapies during their stay:yes 1 assist with gait belt and walker    Patient MUST void prior to discharge:yes    Patient able to tolerate oral intake:yes    Pain has adequate pain control using Oral analgesics:Denies    Does patient have an identified :yes    Has goal D/C date and time been discussed with patient: excepted  discharge 2/10/2024      Pt is alert and oriented, VSS   2 lpm O 2  NC , [place] citizen speaking son was , Pt is Covid positive , Blood sugar check. Potassium redraw tomorrow. NS is infusing at 125 rates.At 6 pm  pt temp was 100.6 give Tylenollyubov MD and later recheck  temp 98.5 jared POMPA.

## 2024-02-10 NOTE — PLAN OF CARE
Pt A&Ox4; VSS on RA but will desats below 90% when sleeping. Has been afebrile. Denies pain or any discomfort this shift. Ambulated to BR; voiding well. A of 1 GB/W. NS running @ 125 mL/hr. BS check; stable. Armenian speaking but understands English. Slept most of this shift. Remains on COVID precautions. Possible discharge to TCU pending.

## 2024-02-10 NOTE — PROGRESS NOTES
[facility]    Medicine Progress Note - Hospitalist Service    Date of Admission:  2/8/2024    Assessment & Plan   Leola Anguiano is a 83 year-old female with past medical history including depression, diabetes mellitus type 2, hyperlipidemia, hypertension, obesity, gout who presents with unwitnessed fall, weakness, confusion. Admitted on 2/8/2024.      COVID19 infection   Acute hypoxic respiratory failure   Generalized weakness  Unwitnessed fall   *Symptom onset ~2/1/24 with cough, rhinitis. Subsequently with increased weakness  *Patient found on ground by son AM of 2/8, unsure how long she was on ground, possibly overnight  *COVID19/Influenza/RSV PCR positive for COVID  *CXR with no focal infiltrate   *Head CT, CT cervical spine negative for acute fracture  *Hypoxic to 86% on room air in ED  - Special precautions   - Dexamethasone 6 mg daily for 10 days or hospital discharge  - Remdesivir for 5 days or hospital discharge  - Enoxaparin subcutaneous for DVT prophylaxis   - Oxygen as needed, wean as tolerated  - Famotidine for GI prophylaxis  - PT consulted-TCU being recommended.     Acute encephalopathy-improved  Possible concussion  *Per son, patient is typically sharp at baseline and manages all her own ADLs at home  *Since fall, has seemed to be more confused  *Unclear if related to COVID or possible concussion from fall   - OT consulted  - Re-orient as needed  - Maintain normal day/night, sleep wake cycles  - Minimize sedating/altering medications as able  - Treat separate conditions as detailed above/below      Rhabdomyolysis, mild   Prerenal azotemia  *CK 1781, AST 68, likely from prolonged period down  *UA with trace blood, no RBC  *BUN 29, creatinine normal  - Continue IVF-decrease rate on 2/10  -CK improved to 1/1/2008 on 2/10.  - Monitor BMP      Diabetes mellitus, type 2   HgbA1c 6.3% 2/14/23. Prior to admission on metformin (needs med rec).  - Medium sliding scale insulin   -  "Hold oral medications while hospitalized  - Hypoglycemia protocol     Mild hyponatremia-resolved with IVF  Sodium 134. Likely hypovolemic     Scalp laceration  *Repaired with wound glue in ED.  *Tetanus injection given  - Keep open with no ointment, allow to fall off on own     Hyperlipidemia  - Hold prior to admission statin until CK normalizes     Insomnia  - Hold prior to admission gabapentin until mental status normalizes     Hypertension  - Resume prior to admission lisinopril, metoprolol XL as able once reconciled. BP is soft this morning and hold at this time  - Hold prior to admission hydrochlorothiazide while on IVF      Gout  - Resume prior to admission allopurinol when dose confirmed by pharmacy      Normocytic anemia  -Hemoglobin 10.3.  Monitor.  No evidence of bleeding.  It was 11.7 at admission.  Could be dilutional secondary to IVF.     Diet: Combination Diet Moderate Consistent Carb (60 g CHO per Meal) Diet    DVT Prophylaxis: Enoxaparin (Lovenox) SQ  Marte Catheter: Not present  Lines: None     Cardiac Monitoring: None  Code Status: Full Code      Clinically Significant Risk Factors          # Hypocalcemia: Lowest Ca = 8.1 mg/dL in last 2 days, will monitor and replace as appropriate                # Severe Obesity: Estimated body mass index is 41.59 kg/m  as calculated from the following:    Height as of this encounter: 1.448 m (4' 9.01\").    Weight as of this encounter: 87.2 kg (192 lb 3.9 oz)., PRESENT ON ADMISSION            Disposition Plan  likely in the next 2 days TCU vs home with home care pending progress.  Patient notes that she lives alone and her son lives about 20 minutes away.  He works and cannot provide 24/7 assist.     Expected Discharge Date: 02/11/2024                    Marie Fleming MD  Hospitalist Service  [facility]  Securely message with ip[employer]atel (more info)  Text page via [facility] Paging/Directory "   ______________________________________________________________________    Interval History   Stable and afebrile overnight.  Asks about going home but acknowledges that she feels weak and is not well enough to discharge today.  We discussed possibly needing TCU.  She notes that she lives alone.  When discussing TCU she notes that she is 84 and does not have much time left.  But again also acknowledges that she is too weak to be alone at home.      Physical Exam   Vital Signs: Temp: 97.4  F (36.3  C) Temp src: Oral BP: 114/59 Pulse: 51   Resp: 17 SpO2: 93 % O2 Device: Nasal cannula Oxygen Delivery: 2 LPM  Weight: 192 lbs 3.86 oz    General Appearance: Alert, awake and no apparent distress, appears to be answering questions appropriately  Respiratory: clear to auscultation bilaterally  Cardiovascular: regular rate and rhythm  GI: soft and non-tender  Skin: warm and dry      Medical Decision Making           Data     I have personally reviewed the following data over the past 24 hrs:    4.2  \   10.3 (L)   / 221     139 110 (H) 22.0 /  134 (H)   4.1 21 (L) 0.62 \       Imaging results reviewed over the past 24 hrs:   No results found for this or any previous visit (from the past 24 hour(s)).

## 2024-02-11 LAB
CK SERPL-CCNC: 622 U/L (ref 26–192)
GLUCOSE BLDC GLUCOMTR-MCNC: 116 MG/DL (ref 70–99)
GLUCOSE BLDC GLUCOMTR-MCNC: 122 MG/DL (ref 70–99)
GLUCOSE BLDC GLUCOMTR-MCNC: 126 MG/DL (ref 70–99)
GLUCOSE BLDC GLUCOMTR-MCNC: 137 MG/DL (ref 70–99)
POTASSIUM SERPL-SCNC: 4.1 MMOL/L (ref 3.4–5.3)

## 2024-02-11 PROCEDURE — 82550 ASSAY OF CK (CPK): CPT | Performed by: HOSPITALIST

## 2024-02-11 PROCEDURE — 99232 SBSQ HOSP IP/OBS MODERATE 35: CPT | Performed by: HOSPITALIST

## 2024-02-11 PROCEDURE — 258N000003 HC RX IP 258 OP 636: Performed by: INTERNAL MEDICINE

## 2024-02-11 PROCEDURE — 250N000013 HC RX MED GY IP 250 OP 250 PS 637: Performed by: HOSPITALIST

## 2024-02-11 PROCEDURE — 250N000012 HC RX MED GY IP 250 OP 636 PS 637: Performed by: INTERNAL MEDICINE

## 2024-02-11 PROCEDURE — 120N000001 HC R&B MED SURG/OB

## 2024-02-11 PROCEDURE — 36415 COLL VENOUS BLD VENIPUNCTURE: CPT | Performed by: PSYCHIATRY & NEUROLOGY

## 2024-02-11 PROCEDURE — 84132 ASSAY OF SERUM POTASSIUM: CPT | Performed by: PSYCHIATRY & NEUROLOGY

## 2024-02-11 PROCEDURE — 250N000013 HC RX MED GY IP 250 OP 250 PS 637: Performed by: INTERNAL MEDICINE

## 2024-02-11 PROCEDURE — 250N000011 HC RX IP 250 OP 636: Mod: JZ | Performed by: INTERNAL MEDICINE

## 2024-02-11 PROCEDURE — 250N000011 HC RX IP 250 OP 636: Performed by: HOSPITALIST

## 2024-02-11 PROCEDURE — 258N000003 HC RX IP 258 OP 636: Performed by: HOSPITALIST

## 2024-02-11 RX ORDER — METOPROLOL SUCCINATE 50 MG/1
50 TABLET, EXTENDED RELEASE ORAL EVERY MORNING
Status: DISCONTINUED | OUTPATIENT
Start: 2024-02-11 | End: 2024-02-13 | Stop reason: HOSPADM

## 2024-02-11 RX ORDER — METOPROLOL SUCCINATE 25 MG/1
25 TABLET, EXTENDED RELEASE ORAL EVERY EVENING
Status: DISCONTINUED | OUTPATIENT
Start: 2024-02-11 | End: 2024-02-13 | Stop reason: HOSPADM

## 2024-02-11 RX ADMIN — SODIUM CHLORIDE 50 ML: 9 INJECTION, SOLUTION INTRAVENOUS at 10:58

## 2024-02-11 RX ADMIN — REMDESIVIR 100 MG: 100 INJECTION, POWDER, LYOPHILIZED, FOR SOLUTION INTRAVENOUS at 09:03

## 2024-02-11 RX ADMIN — ENOXAPARIN SODIUM 40 MG: 40 INJECTION SUBCUTANEOUS at 09:02

## 2024-02-11 RX ADMIN — FAMOTIDINE 20 MG: 20 TABLET ORAL at 20:15

## 2024-02-11 RX ADMIN — DEXAMETHASONE 6 MG: 2 TABLET ORAL at 12:51

## 2024-02-11 RX ADMIN — ALLOPURINOL 200 MG: 100 TABLET ORAL at 15:51

## 2024-02-11 RX ADMIN — ENOXAPARIN SODIUM 40 MG: 40 INJECTION SUBCUTANEOUS at 20:15

## 2024-02-11 RX ADMIN — SODIUM CHLORIDE: 9 INJECTION, SOLUTION INTRAVENOUS at 10:58

## 2024-02-11 RX ADMIN — FAMOTIDINE 20 MG: 20 TABLET ORAL at 09:02

## 2024-02-11 RX ADMIN — METOPROLOL SUCCINATE 50 MG: 50 TABLET, EXTENDED RELEASE ORAL at 13:01

## 2024-02-11 RX ADMIN — ASPIRIN 81 MG: 81 TABLET, COATED ORAL at 09:02

## 2024-02-11 ASSESSMENT — ACTIVITIES OF DAILY LIVING (ADL)
ADLS_ACUITY_SCORE: 28
ADLS_ACUITY_SCORE: 32
ADLS_ACUITY_SCORE: 28
ADLS_ACUITY_SCORE: 32
ADLS_ACUITY_SCORE: 32
ADLS_ACUITY_SCORE: 28

## 2024-02-11 NOTE — PLAN OF CARE
Dx: Found down, Rhabdomyolysis, & Covid +  Patient vital signs are at baseline: Yes, A/O x3 (disoriented to situation @times)  Patient able to ambulate as they were prior to admission or with assist devices provided by therapies during their stay:  No,  Reason:  Improving, still requiring SBA w/FWW & gait belt.  Patient MUST void prior to discharge:  Yes, WDL's  Patient able to tolerate oral intake:  Yes  Pain has adequate pain control using Oral analgesics:  Yes  Does patient have an identified :  Yes, son Thee  Has goal D/C date and time been discussed with patient:  Yes, likely in next two days to TCU per MD    Plan of Care Reviewed With: child    Overall Patient Progress: improvingOverall Patient Progress: improving

## 2024-02-11 NOTE — PROGRESS NOTES
Patient vital signs are at baseline:yes   Patient able to ambulate as they were prior to admission or with assist devices provided by therapies during their stay:yes up with 1 assist gait belt /walker   Patient MUST void prior to discharge:yes     Patient able to tolerate oral intake:yes    Pain has adequate pain control using Oral analgesics:Denied     Does patient have an identified :yes    Has goal D/C date and time been discussed with patient: Recommendation of TCU. Pt is on isolation for covid, Pt is Salvadorean speaking  son was  at bedside. NS at 75 rates and potassium protocol for re draw 2/11/2024

## 2024-02-11 NOTE — PROGRESS NOTES
Patient vital signs are at baseline: Yes  Patient able to ambulate as they were prior to admission or with assist devices provided by therapies during their stay:  Yes A1GBW  Patient MUST void prior to discharge:  Yes  Patient able to tolerate oral intake:  Yes  Pain has adequate pain control using Oral analgesics:  Yes  Does patient have an identified :  Yes  Has goal D/C date and time been discussed with patient:  TBD    AOX4, Lao speaking,  BG ACHS, ND @75ml/hr, Covid+, on 2L NC.

## 2024-02-11 NOTE — PROGRESS NOTES
[facility]    Medicine Progress Note - Hospitalist Service    Date of Admission:  2/8/2024    Assessment & Plan   Leola Anguiano is a 83 year-old female with past medical history including depression, diabetes mellitus type 2, hyperlipidemia, hypertension, obesity, gout who presents with unwitnessed fall, weakness, confusion. Admitted on 2/8/2024.      COVID19 infection   Acute hypoxic respiratory failure   Generalized weakness  Unwitnessed fall   *Symptom onset ~2/1/24 with cough, rhinitis. Subsequently with increased weakness  *Patient found on ground by son AM of 2/8, unsure how long she was on ground, possibly overnight  *COVID19/Influenza/RSV PCR positive for COVID  *CXR with no focal infiltrate   *Head CT, CT cervical spine negative for acute fracture  *Hypoxic to 86% on room air in ED  - Special precautions   - Dexamethasone 6 mg daily for 10 days or hospital discharge  - Remdesivir for 5 days or hospital discharge  - Enoxaparin subcutaneous for DVT prophylaxis   - Oxygen as needed, wean as tolerated  - Famotidine for GI prophylaxis  - PT consulted-TCU being recommended.     Acute encephalopathy-improved  Possible concussion  *Per son, patient is typically sharp at baseline and manages all her own ADLs at home  *Since fall, has seemed to be more confused  *Unclear if related to COVID or possible concussion from fall   - OT consulted.  Slums score was 9/30 on 2/9.  Son however notes that patient was quite sleepy that day and he does not believe that this was an accurate representation of her abilities as she was not having a good day that day.  He feels that her mentation is much improved now and he is not concerned about her cognition.  - Re-orient as needed  - Maintain normal day/night, sleep wake cycles  - Minimize sedating/altering medications as able  - Treat separate conditions as detailed above/below      Rhabdomyolysis, mild, improving  Prerenal azotemia  *CK 1781, AST 68,  "likely from prolonged period down  *UA with trace blood, no RBC  *BUN 29, creatinine normal  -Discontinue IVF on 2/11  -CK improved to around 600 on 2/11  - Monitor BMP      Diabetes mellitus, type 2   HgbA1c 6.3% 2/14/23. Prior to admission on metformin  - Medium sliding scale insulin   - Hold oral medications while hospitalized  - Hypoglycemia protocol     Mild hyponatremia-resolved with IVF  Sodium 134. Likely hypovolemic.  Holding PTA hydrochlorothiazide.     Scalp laceration  *Repaired with wound glue in ED.  *Tetanus injection given  - Keep open with no ointment, allow to fall off on own     Hyperlipidemia  - Hold prior to admission statin until CK normalizes     Insomnia  - Hold prior to admission gabapentin until mental status normalizes     Hypertension  -Resumed PTA metoprolol on 2/11.  Continue to hold PTA hydrochlorothiazide and lisinopril.  BP is soft.  Monitor orthostatics.     Gout  - Resume prior to admission allopurinol      Normocytic anemia  -Hemoglobin 10.3.  Monitor.  No evidence of bleeding.  It was 11.7 at admission.  Could be dilutional secondary to IVF.     Diet: Combination Diet Moderate Consistent Carb (60 g CHO per Meal) Diet    DVT Prophylaxis: Enoxaparin (Lovenox) SQ  Marte Catheter: Not present  Lines: None     Cardiac Monitoring: None  Code Status: Full Code      Clinically Significant Risk Factors                         # Severe Obesity: Estimated body mass index is 41.59 kg/m  as calculated from the following:    Height as of this encounter: 1.448 m (4' 9.01\").    Weight as of this encounter: 87.2 kg (192 lb 3.9 oz)., PRESENT ON ADMISSION            Disposition Plan  likely in the next 1-2 days TCU vs home with home care pending progress.  Patient notes that she lives alone and her son lives about 20 minutes away.  He works and cannot provide 24/7 assist.  Discussed at length with patient's son on 2/11-he is hoping that she gets strong enough to come home and he can support her " although he cannot be with her 24/7.  But he is also open to TCU if that is what is recommended.     Expected Discharge Date: 02/12/2024                    Marie Fleming MD  Hospitalist Service  [facility]  Securely message with Laura (more info)  Text page via [employer] Paging/Directory   ______________________________________________________________________    Interval History   Stable and afebrile overnight.  Notes that she is feeling much better today.  Son interpreted via phone.  Apparently she complained of having some palpitations overnight though there is nothing recorded per nursing and her vitals were noted to be stable per chart.  He was concerned that her metoprolol has been on hold and notes that she has been on this for a long time.  After discussion, we agreed to restart metoprolol.      Physical Exam   Vital Signs: Temp: 97.4  F (36.3  C) Temp src: Oral BP: 134/56 Pulse: 62   Resp: 16 SpO2: 96 % O2 Device: None (Room air) Oxygen Delivery: 2 LPM  Weight: 192 lbs 3.86 oz    General Appearance: Alert, awake and no apparent distress, appears to be answering questions appropriately  Respiratory: clear to auscultation bilaterally  Cardiovascular: regular rate and rhythm  GI: soft and non-tender  Skin: warm and dry      Medical Decision Making           Data     I have personally reviewed the following data over the past 24 hrs:    N/A  \   N/A   / N/A     N/A N/A N/A /  116 (H)   4.1 N/A N/A \       Imaging results reviewed over the past 24 hrs:   No results found for this or any previous visit (from the past 24 hour(s)).

## 2024-02-12 ENCOUNTER — APPOINTMENT (OUTPATIENT)
Dept: OCCUPATIONAL THERAPY | Facility: CLINIC | Age: 84
DRG: 177 | End: 2024-02-12
Payer: COMMERCIAL

## 2024-02-12 LAB
CREAT SERPL-MCNC: 0.68 MG/DL (ref 0.51–0.95)
EGFRCR SERPLBLD CKD-EPI 2021: 86 ML/MIN/1.73M2
GLUCOSE BLDC GLUCOMTR-MCNC: 127 MG/DL (ref 70–99)
GLUCOSE BLDC GLUCOMTR-MCNC: 128 MG/DL (ref 70–99)
GLUCOSE BLDC GLUCOMTR-MCNC: 136 MG/DL (ref 70–99)
GLUCOSE BLDC GLUCOMTR-MCNC: 159 MG/DL (ref 70–99)
GLUCOSE BLDC GLUCOMTR-MCNC: 179 MG/DL (ref 70–99)
HGB BLD-MCNC: 11.9 G/DL (ref 11.7–15.7)
PLATELET # BLD AUTO: 240 10E3/UL (ref 150–450)

## 2024-02-12 PROCEDURE — 36415 COLL VENOUS BLD VENIPUNCTURE: CPT | Performed by: INTERNAL MEDICINE

## 2024-02-12 PROCEDURE — 250N000012 HC RX MED GY IP 250 OP 636 PS 637: Performed by: INTERNAL MEDICINE

## 2024-02-12 PROCEDURE — 97530 THERAPEUTIC ACTIVITIES: CPT | Mod: GO

## 2024-02-12 PROCEDURE — 82565 ASSAY OF CREATININE: CPT | Performed by: INTERNAL MEDICINE

## 2024-02-12 PROCEDURE — 85018 HEMOGLOBIN: CPT | Performed by: HOSPITALIST

## 2024-02-12 PROCEDURE — 93010 ELECTROCARDIOGRAM REPORT: CPT | Performed by: INTERNAL MEDICINE

## 2024-02-12 PROCEDURE — 120N000001 HC R&B MED SURG/OB

## 2024-02-12 PROCEDURE — 250N000011 HC RX IP 250 OP 636: Mod: JZ | Performed by: INTERNAL MEDICINE

## 2024-02-12 PROCEDURE — 250N000011 HC RX IP 250 OP 636: Performed by: HOSPITALIST

## 2024-02-12 PROCEDURE — 99232 SBSQ HOSP IP/OBS MODERATE 35: CPT | Performed by: HOSPITALIST

## 2024-02-12 PROCEDURE — 250N000013 HC RX MED GY IP 250 OP 250 PS 637: Performed by: INTERNAL MEDICINE

## 2024-02-12 PROCEDURE — 85049 AUTOMATED PLATELET COUNT: CPT | Performed by: INTERNAL MEDICINE

## 2024-02-12 PROCEDURE — 93005 ELECTROCARDIOGRAM TRACING: CPT

## 2024-02-12 PROCEDURE — 97535 SELF CARE MNGMENT TRAINING: CPT | Mod: GO

## 2024-02-12 PROCEDURE — 258N000003 HC RX IP 258 OP 636: Performed by: INTERNAL MEDICINE

## 2024-02-12 RX ADMIN — ASPIRIN 81 MG: 81 TABLET, COATED ORAL at 09:26

## 2024-02-12 RX ADMIN — INSULIN ASPART 1 UNITS: 100 INJECTION, SOLUTION INTRAVENOUS; SUBCUTANEOUS at 19:17

## 2024-02-12 RX ADMIN — DEXAMETHASONE 6 MG: 2 TABLET ORAL at 13:46

## 2024-02-12 RX ADMIN — ENOXAPARIN SODIUM 40 MG: 40 INJECTION SUBCUTANEOUS at 09:27

## 2024-02-12 RX ADMIN — REMDESIVIR 100 MG: 100 INJECTION, POWDER, LYOPHILIZED, FOR SOLUTION INTRAVENOUS at 09:26

## 2024-02-12 RX ADMIN — FAMOTIDINE 20 MG: 20 TABLET ORAL at 09:27

## 2024-02-12 RX ADMIN — FAMOTIDINE 20 MG: 20 TABLET ORAL at 21:32

## 2024-02-12 RX ADMIN — ALLOPURINOL 200 MG: 100 TABLET ORAL at 17:37

## 2024-02-12 RX ADMIN — SODIUM CHLORIDE 50 ML: 9 INJECTION, SOLUTION INTRAVENOUS at 09:27

## 2024-02-12 RX ADMIN — ENOXAPARIN SODIUM 40 MG: 40 INJECTION SUBCUTANEOUS at 21:32

## 2024-02-12 ASSESSMENT — ACTIVITIES OF DAILY LIVING (ADL)
DEPENDENT_IADLS:: INDEPENDENT
ADLS_ACUITY_SCORE: 32
ADLS_ACUITY_SCORE: 28
ADLS_ACUITY_SCORE: 32
ADLS_ACUITY_SCORE: 29
ADLS_ACUITY_SCORE: 28
ADLS_ACUITY_SCORE: 32
ADLS_ACUITY_SCORE: 32

## 2024-02-12 NOTE — CONSULTS
Care Management Initial Consult    General Information  Assessment completed with: Gurpreet, Thee  Type of CM/SW Visit: Initial Assessment    Primary Care Provider verified and updated as needed: Yes   Readmission within the last 30 days: no previous admission in last 30 days      Reason for Consult: discharge planning  Advance Care Planning:            Communication Assessment  Patient's communication style: spoken language (non-English)    Hearing Difficulty or Deaf: no   Wear Glasses or Blind: yes    Cognitive  Cognitive/Neuro/Behavioral: .WDL except  Level of Consciousness: alert, confused  Arousal Level: opens eyes spontaneously  Orientation: disoriented to, situation  Mood/Behavior: cooperative  Best Language: 0 - No aphasia  Speech: clear    Living Environment:   People in home: alone     Current living Arrangements: apartment      Able to return to prior arrangements: yes       Family/Social Support:  Care provided by: self  Provides care for: no one  Marital Status:   Children          Description of Support System: Supportive    Support Assessment: Adequate family and caregiver support    Current Resources:   Patient receiving home care services: No     Community Resources: None  Equipment currently used at home: walker, standard, grab bar, toilet, grab bar, tub/shower, shower chair  Supplies currently used at home: None    Employment/Financial:  Employment Status: retired        Financial Concerns: none   Referral to Financial Worker: No       Does the patient's insurance plan have a 3 day qualifying hospital stay waiver?  No    Lifestyle & Psychosocial Needs:  Social Determinants of Health     Food Insecurity: Not on file   Depression: Not on file   Housing Stability: Not on file   Tobacco Use: Low Risk  (2/9/2024)    Patient History     Smoking Tobacco Use: Never     Smokeless Tobacco Use: Never     Passive Exposure: Not on file   Financial Resource Strain: Not on file   Alcohol Use: Not on file    Transportation Needs: Not on file   Physical Activity: Not on file   Interpersonal Safety: Not on file   Stress: Not on file   Social Connections: Not on file       Functional Status:  Prior to admission patient needed assistance:   Dependent ADLs:: Ambulation-walker  Dependent IADLs:: Independent  Assesssment of Functional Status: Not at  functional baseline      Additional Information:  Leola Anguiano is a 83 year-old female with past medical history including depression, diabetes mellitus type 2, hyperlipidemia, hypertension, obesity, gout who presents with unwitnessed fall, weakness, confusion. Admitted on 2/8/2024. Writer was consulted for discharge planning with the patient. Writer noted the patient is Brazilian speaking and is A&O X3 so phone call was placed to son. Son stated he would be coming into the hospital later today to be able to discuss discharge planning with writer. Patient does live alone in her apartment and is retired. Son states that the patient would benefit from increased services in the home if able but is willing to look at both [facility] and TCU as potential discharge plans. Patient may be able to go home if recommendations charge. CCRN was updated. Writer sent referrals to TCU's in the area. Son is undecided about transport.     Gabe Evangelista [facility]

## 2024-02-12 NOTE — CONSULTS
Patient Name: Leola Anguiano   MRN: [id number]   Admit Date: 2/8/2024    Current Infection Status: COVID-19   Current Isolation Status: Special Precautions     Review of COVID-19 status and required isolation precautions    Refer to Special Precautions Duration and Period of Transmissibility Guideline, in Policy Tech.        Involved parties: Ryan Powell, Infection Prevention and Other RN .    Criteria used to make decision: Symptoms Dates: 2/1/2024 .    The involved parties have reviewed this patient's chart per the Special Precautions Duration and Period of Transmissibility guideline and have taken the following action:     DECISION - OPTION 1: Patient meets all the criteria for Special Precautions isolation discontinuation and this writer will resolve the COVID-19 infection flag and isolation status, due to: Immunocompetent Symptomatic: Mild or Moderate: 10 days since symptoms began AND greater than or equal to 24hrs since last fever (without fever-reducing meds) AND COVID-19 symptoms have substantially improved.     After speaking with care team, determined it is okay to backdate to symptom onset of 2/1/24. On room air and no fevers.      Ryan Powell, Infection Prevention

## 2024-02-12 NOTE — PROGRESS NOTES
Patient vital signs are at baseline: Yes  Patient able to ambulate as they were prior to admission or with assist devices provided by therapies during their stay:  Yes A1GBW  Patient MUST void prior to discharge:  Yes  Patient able to tolerate oral intake:  Yes  Pain has adequate pain control using Oral analgesics:  Yes  Does patient have an identified :  Yes  Has goal D/C date and time been discussed with patient:  Yes     A/Ox3  disoriented to situation at times, Lao speaking, BG ACHS, Covid +, Discharge to TCU prior returning home.

## 2024-02-12 NOTE — PROGRESS NOTES
"Dr. Fleming paged- \"HR this morning is 45. I held Metoprolol. Overnight it was around the same. Possibly a side effect from the Remdesivir? TY~ \"    EKG ordered  "

## 2024-02-13 ENCOUNTER — APPOINTMENT (OUTPATIENT)
Dept: OCCUPATIONAL THERAPY | Facility: CLINIC | Age: 84
DRG: 177 | End: 2024-02-13
Payer: COMMERCIAL

## 2024-02-13 VITALS
HEIGHT: 57 IN | DIASTOLIC BLOOD PRESSURE: 81 MMHG | RESPIRATION RATE: 18 BRPM | WEIGHT: 192.24 LBS | TEMPERATURE: 97.6 F | OXYGEN SATURATION: 95 % | HEART RATE: 71 BPM | BODY MASS INDEX: 41.47 KG/M2 | SYSTOLIC BLOOD PRESSURE: 162 MMHG

## 2024-02-13 LAB
GLUCOSE BLDC GLUCOMTR-MCNC: 119 MG/DL (ref 70–99)
GLUCOSE BLDC GLUCOMTR-MCNC: 122 MG/DL (ref 70–99)
GLUCOSE BLDC GLUCOMTR-MCNC: 157 MG/DL (ref 70–99)

## 2024-02-13 PROCEDURE — 250N000012 HC RX MED GY IP 250 OP 636 PS 637: Performed by: INTERNAL MEDICINE

## 2024-02-13 PROCEDURE — 250N000013 HC RX MED GY IP 250 OP 250 PS 637: Performed by: INTERNAL MEDICINE

## 2024-02-13 PROCEDURE — 250N000011 HC RX IP 250 OP 636: Performed by: HOSPITALIST

## 2024-02-13 PROCEDURE — 258N000003 HC RX IP 258 OP 636: Performed by: INTERNAL MEDICINE

## 2024-02-13 PROCEDURE — 97535 SELF CARE MNGMENT TRAINING: CPT | Mod: GO

## 2024-02-13 PROCEDURE — 250N000011 HC RX IP 250 OP 636: Mod: JZ | Performed by: INTERNAL MEDICINE

## 2024-02-13 PROCEDURE — 99239 HOSP IP/OBS DSCHRG MGMT >30: CPT | Performed by: HOSPITALIST

## 2024-02-13 RX ORDER — ATORVASTATIN CALCIUM 40 MG/1
40 TABLET, FILM COATED ORAL DAILY
Qty: 30 TABLET | Refills: 0 | Status: SHIPPED | OUTPATIENT
Start: 2024-02-13

## 2024-02-13 RX ORDER — BISACODYL 10 MG
10 SUPPOSITORY, RECTAL RECTAL DAILY PRN
Qty: 10 SUPPOSITORY | Refills: 0 | Status: SHIPPED | OUTPATIENT
Start: 2024-02-13

## 2024-02-13 RX ORDER — ALLOPURINOL 100 MG/1
200 TABLET ORAL DAILY
Qty: 30 TABLET | Refills: 0 | Status: SHIPPED | OUTPATIENT
Start: 2024-02-13

## 2024-02-13 RX ORDER — ERGOCALCIFEROL (VITAMIN D2) 50 MCG
50 CAPSULE ORAL DAILY
Qty: 30 CAPSULE | Refills: 0 | Status: SHIPPED | OUTPATIENT
Start: 2024-02-13

## 2024-02-13 RX ORDER — HYDROCHLOROTHIAZIDE 12.5 MG/1
12.5 TABLET ORAL DAILY
Qty: 30 TABLET | Refills: 0 | Status: SHIPPED | OUTPATIENT
Start: 2024-02-13

## 2024-02-13 RX ORDER — AMOXICILLIN 250 MG
2 CAPSULE ORAL 2 TIMES DAILY PRN
Qty: 30 TABLET | Refills: 0 | Status: SHIPPED | OUTPATIENT
Start: 2024-02-13

## 2024-02-13 RX ORDER — LISINOPRIL 20 MG/1
20 TABLET ORAL DAILY
Qty: 30 TABLET | Refills: 0 | Status: SHIPPED | OUTPATIENT
Start: 2024-02-13

## 2024-02-13 RX ORDER — ASPIRIN 81 MG/1
81 TABLET ORAL DAILY
Qty: 30 TABLET | Refills: 0 | Status: SHIPPED | OUTPATIENT
Start: 2024-02-13

## 2024-02-13 RX ADMIN — SODIUM CHLORIDE 50 ML: 9 INJECTION, SOLUTION INTRAVENOUS at 08:51

## 2024-02-13 RX ADMIN — ENOXAPARIN SODIUM 40 MG: 40 INJECTION SUBCUTANEOUS at 08:51

## 2024-02-13 RX ADMIN — ASPIRIN 81 MG: 81 TABLET, COATED ORAL at 08:51

## 2024-02-13 RX ADMIN — ALLOPURINOL 200 MG: 100 TABLET ORAL at 16:04

## 2024-02-13 RX ADMIN — FAMOTIDINE 20 MG: 20 TABLET ORAL at 08:51

## 2024-02-13 RX ADMIN — DEXAMETHASONE 6 MG: 2 TABLET ORAL at 14:01

## 2024-02-13 RX ADMIN — REMDESIVIR 100 MG: 100 INJECTION, POWDER, LYOPHILIZED, FOR SOLUTION INTRAVENOUS at 08:52

## 2024-02-13 ASSESSMENT — ACTIVITIES OF DAILY LIVING (ADL)
ADLS_ACUITY_SCORE: 32

## 2024-02-13 NOTE — PLAN OF CARE
Goal Outcome Evaluation:    Pt A to self and place but disoriented to time and situation. Rusian speaking interpretor utilized for every interaction. She did not eat her dinner this shift stating that she wasn't hungry. Pt denied pain but did report some confusion around time of day. Pt worried that friend at home will not know where she is but was not able to provide telephone number. RN told her we could check with son in the AM. She was up 1 assist, GB, walker to utilize the bathroom. BS checked and insulin coverage given.

## 2024-02-13 NOTE — DISCHARGE SUMMARY
Discharge Summary  Hospitalist    Date of Admission:  2/8/2024  Date of Discharge:  2/13/2024  Discharging Provider: Marie Fleming MD, MD  Date of Service (when I saw the patient): 02/13/24    Discharge Diagnoses   COVID19 infection   Acute hypoxic respiratory failure   Generalized weakness  Unwitnessed fall     History of Present Illness   Please refer H & P for details.      Hospital Course   Leola Anguiano is a 83 year-old female with past medical history including depression, diabetes mellitus type 2, hyperlipidemia, hypertension, obesity, gout who presents with unwitnessed fall, weakness, confusion. Admitted on 2/8/2024.      COVID19 infection   Acute hypoxic respiratory failure   Generalized weakness  Unwitnessed fall   *Symptom onset ~2/1/24 with cough, rhinitis. Subsequently with increased weakness  *Patient found on ground by son AM of 2/8, unsure how long she was on ground, possibly overnight  *COVID19/Influenza/RSV PCR positive for COVID  *CXR with no focal infiltrate   *Head CT, CT cervical spine negative for acute fracture  *Hypoxic to 86% on room air in ED  - Special precautions   - Dexamethasone 6 mg daily, discontinued at discharge  - Remdesivir, discontinued at hospital discharge  - Enoxaparin subcutaneous for DVT prophylaxis   - Oxygen as needed, wean as tolerated.  Currently weaned off, satting well on room air.  - Famotidine for GI prophylaxis  -Therapies recommending TCU versus home with home therapies.  Patient and family prefer that she discharge home.  She is discharged home in stable condition on 2/13.  Was able to walk the hallways with standby assist on day of discharge.     Acute encephalopathy-improved  Possible concussion  *Per son, patient is typically sharp at baseline and manages all her own ADLs at home  *Since fall, has seemed to be more confused  *Unclear if related to COVID or possible concussion from fall   - OT consulted.  Slums score was 9/30 on 2/9.  Son however notes  that patient was quite sleepy that day and he does not believe that this was an accurate representation of her abilities as she was not having a good day that day.  He feels that her mentation is much improved now and he is not concerned about her cognition.  - Re-orient as needed  - Maintain normal day/night, sleep wake cycles  - Minimize sedating/altering medications as able  - Treat separate conditions as detailed above/below      Rhabdomyolysis, mild, improving  Prerenal azotemia  *CK 1781, AST 68, likely from prolonged period down  *UA with trace blood, no RBC  *BUN 29, creatinine normal  -Discontinue IVF on 2/11  -CK improved to around 600 on 2/11     Diabetes mellitus, type 2   HgbA1c 6.3% 2/14/23. Prior to admission on metformin.  This was resumed at discharge.  - Medium sliding scale insulin   - Hold oral medications while hospitalized  - Hypoglycemia protocol     Mild hyponatremia-resolved with IVF  Sodium 134. Likely hypovolemic.  Holding PTA hydrochlorothiazide.  HCTZ resumed at discharge.     Scalp laceration  *Repaired with wound glue in ED.  *Tetanus injection given  - Keep open with no ointment, allow to fall off on own     Hyperlipidemia  - Hold prior to admission statin until CK normalizes, resumed at discharge.        Hypertension  -Resumed PTA metoprolol on 2/11.  Continue to hold PTA hydrochlorothiazide and lisinopril.  BP is soft.  Monitor orthostatics.  Metoprolol placed on hold, she received only 1 dose, noted to be bradycardic in the 40s.  -Continue to hold metoprolol at discharge.  Resumed lisinopril and HCTZ at discharge.  Will need to follow-up with PCP.  Metoprolol placed on hold given bradycardia.     Gout  - Resume prior to admission allopurinol         Normocytic anemia  -Hemoglobin 10.3.  Monitor.  No evidence of bleeding.  It was 11.7 at admission.  Could be dilutional secondary to IVF.    Patient is stable for discharge home with home therapies.    Marie Fleming MD,  MD      Pending Results   These results will be followed up by Hospitalist team.  Unresulted Labs Ordered in the Past 30 Days of this Admission       No orders found from 1/9/2024 to 2/9/2024.            Code Status   Full Code       Primary Care Physician   [facility]    Follow-ups Needed After Discharge   Follow-up Appointments     Follow-up and recommended labs and tests       [facility] follow- up.  No follow up labs or test are needed.            Physical Exam   Temp: 98.2  F (36.8  C) Temp src: Oral BP: 112/78 Pulse: 62   Resp: 18 SpO2: 96 % O2 Device: None (Room air)    Vitals:    02/10/24 1415   Weight: 87.2 kg (192 lb 3.9 oz)     Vital Signs with Ranges  Temp:  [97.4  F (36.3  C)-98.2  F (36.8  C)] 98.2  F (36.8  C)  Pulse:  [56-62] 62  Resp:  [16-18] 18  BP: (103-153)/(70-81) 112/78  SpO2:  [94 %-96 %] 96 %  No intake/output data recorded.    General Appearance:  Alert, awake and no apparent distress, appears to be answering questions appropriately  Respiratory: clear to auscultation bilaterally  Cardiovascular: regular rate and rhythm  GI: soft and non-tender  Skin: warm and dry          Discharge Disposition   Discharged to home  Condition at discharge: Stable    Consultations This Hospital Stay   OCCUPATIONAL THERAPY ADULT IP CONSULT  CARE MANAGEMENT / SOCIAL WORK IP CONSULT  PHYSICAL THERAPY ADULT IP CONSULT  INFECTION PREVENTION IP CONSULT    Time Spent on this Encounter   IMarie MD, personally saw the patient today and spent greater than 30 minutes discharging this patient.    Discharge Orders      Home Care Referral      Reason for your hospital stay    Covid 19 infection     Follow-up and recommended labs and tests     Follow up with primary care provider, [facility], within 7 days for hospital follow- up.  No follow up labs or test are needed.     Activity    Your activity  upon discharge: activity as tolerated     When to contact your care team    Call your primary doctor if you have any of the following: worsening chest pain, fever, shortness of breath, cough.     Diet    Follow this diet upon discharge: Orders Placed This Encounter      Combination Diet Moderate Consistent Carb (60 g CHO per Meal) Diet     Discharge Medications   Current Discharge Medication List        START taking these medications    Details   bisacodyl (DULCOLAX) 10 MG suppository Place 1 suppository (10 mg) rectally daily as needed for constipation  Qty: 10 suppository, Refills: 0    Associated Diagnoses: Constipation, unspecified constipation type      senna-docusate (SENOKOT-S/PERICOLACE) 8.6-50 MG tablet Take 2 tablets by mouth 2 times daily as needed for constipation  Qty: 30 tablet, Refills: 0    Associated Diagnoses: Constipation, unspecified constipation type           CONTINUE these medications which have CHANGED    Details   allopurinol (ZYLOPRIM) 100 MG tablet Take 2 tablets (200 mg) by mouth daily  Qty: 30 tablet, Refills: 0    Associated Diagnoses: Gout, unspecified cause, unspecified chronicity, unspecified site      aspirin 81 MG EC tablet Take 1 tablet (81 mg) by mouth daily  Qty: 30 tablet, Refills: 0    Associated Diagnoses: Hypertension, unspecified type      atorvastatin (LIPITOR) 40 MG tablet Take 1 tablet (40 mg) by mouth daily  Qty: 30 tablet, Refills: 0    Associated Diagnoses: Hyperlipidemia, unspecified hyperlipidemia type      hydroCHLOROthiazide (HYDRODIURIL) 12.5 MG tablet Take 1 tablet (12.5 mg) by mouth daily  Qty: 30 tablet, Refills: 0    Associated Diagnoses: Hypertension, unspecified type      lisinopril (ZESTRIL) 20 MG tablet Take 1 tablet (20 mg) by mouth daily  Qty: 30 tablet, Refills: 0    Associated Diagnoses: Hypertension, unspecified type      metFORMIN (GLUCOPHAGE) 500 MG tablet Take 1 tablet (500 mg) by mouth daily (with breakfast)  Qty: 30 tablet, Refills: 0     Associated Diagnoses: Type 2 diabetes mellitus with other specified complication, without long-term current use of insulin (H)      Vitamin D2, Ergocalciferol, 50 MCG (2000 UT) CAPS Take 50 mcg by mouth daily  Qty: 30 capsule, Refills: 0    Associated Diagnoses: Vitamin D deficiency           CONTINUE these medications which have NOT CHANGED    Details   ACETAMINOPHEN PO Take 325 mg by mouth every 4 hours as needed for pain      IBUPROFEN PO Take 200 mg by mouth every 6 hours as needed for moderate pain      Omega 3-6-9 Fatty Acids (OMEGA-3 & OMEGA-6 FISH OIL PO) Take by mouth daily      desonide (DESOWEN) 0.05 % ointment Apply topically 2 times daily      hydrocortisone (ANUSOL-HC) 2.5 % cream Place rectally 2 times daily as needed for hemorrhoids           STOP taking these medications       metoprolol succinate ER (TOPROL XL) 25 MG 24 hr tablet Comments:   Reason for Stopping:         metoprolol succinate ER (TOPROL XL) 25 MG 24 hr tablet Comments:   Reason for Stopping:             Allergies   Allergies   Allergen Reactions    Betadine [Povidone Iodine]     Chlorhexidine     Chlorhexidine Gluconate      Data   Most Recent 3 CBC's:  Recent Labs   Lab Test 02/12/24  0720 02/10/24  0609 02/09/24  0015   WBC  --  4.2 8.0   HGB 11.9 10.3* 11.7   MCV  --  95 93    221 285      Most Recent 3 BMP's:  Recent Labs   Lab Test 02/13/24  1228 02/13/24  0856 02/13/24  0157 02/12/24  0753 02/12/24  0720 02/11/24  1639 02/11/24  0859 02/10/24  0815 02/10/24  0609 02/09/24  0852 02/09/24  0015   NA  --   --   --   --   --   --   --   --  139  --  134*   POTASSIUM  --   --   --   --   --   --  4.1  --  4.1  --  3.4   CHLORIDE  --   --   --   --   --   --   --   --  110*  --  96*   CO2  --   --   --   --   --   --   --   --  21*  --  22   BUN  --   --   --   --   --   --   --   --  22.0  --  28.8*   CR  --   --   --   --  0.68  --   --   --  0.62  --  0.81   ANIONGAP  --   --   --   --   --   --   --   --  8  --  16*   EMMA   --   --   --   --   --   --   --   --  8.1*  --  9.0   * 122* 157*   < >  --    < >  --    < > 137*   < > 121*    < > = values in this interval not displayed.     Most Recent 2 LFT's:  Recent Labs   Lab Test 02/09/24  0015   AST 68*   ALT 48   ALKPHOS 71   BILITOTAL 0.3     Most Recent INR's and Anticoagulation Dosing History:  Anticoagulation Dose History           No data to display              Most Recent 3 Troponin's:No lab results found.  Most Recent Cholesterol Panel:No lab results found.  Most Recent 6 Bacteria Isolates From Any Culture (See EPIC Reports for Culture Details):No lab results found.  Most Recent TSH, T4 and A1c Labs:No lab results found.    Results for orders placed or performed during the hospital encounter of 02/08/24   XR Chest 2 Views    Narrative    EXAM: XR CHEST 2 VIEWS  LOCATION: [facility]  DATE: 2/9/2024    INDICATION: cough  COMPARISON: None.      Impression    IMPRESSION: Cardiac enlargement. No focal consolidation or significant effusion. Atherosclerotic aorta. Right shoulder arthroplasty. Degenerative change osseous structures.   Head CT w/o contrast    Narrative    EXAM: CT HEAD W/O CONTRAST, CT CERVICAL SPINE W/O CONTRAST  LOCATION: [facility]  DATE: 2/9/2024    INDICATION: fall, head trauma with altered mental status and neck pain.  COMPARISON: None.  TECHNIQUE:   1) Routine CT Head without IV contrast. Multiplanar reformats. Dose reduction techniques were used.  2) Routine CT Cervical Spine without IV contrast. Multiplanar reformats. Dose reduction techniques were used.    FINDINGS:   HEAD CT:   INTRACRANIAL CONTENTS: No intracranial hemorrhage, extraaxial collection, or mass effect.  No CT evidence of acute infarct. There is scattered diffuse low attenuation within the periventricular and subcortical white matter consistent with diffuse small   vessel ischemic disease. The ventricular system, basal cisterns and the  cortical sulci are consistent with volume loss.     VISUALIZED ORBITS/SINUSES/MASTOIDS: No intraorbital abnormality. No paranasal sinus mucosal disease. No middle ear or mastoid effusion.    BONES/SOFT TISSUES: No acute abnormality.    CERVICAL SPINE CT:   VERTEBRA: There is a slight anterior listhesis of C3 in relation to C4 and a mild anterior listhesis of C4 in relation to C5 and C7 in relation to T1. The vertebral body heights are well-maintained throughout. No fracture or posttraumatic subluxation.     CANAL/FORAMINA: There is prominent diffuse degenerative disc disease throughout the cervical sine most prominent at the C5-C6 and the C6-C7 disc space levels with prominent anterior osteophyte formation and posterior osteophyte formation. There is   diffuse facet arthropathy visualized.    At the C3-C4 disc space level there is moderate left neural foraminal narrowing.    At the C4-C5 disc space level there is mild left neural foraminal narrowing.    At the C5-C6 disc space level there is moderate left neural foraminal narrowing.    At the C6-C7 disc space level there is mild canal compromise and mild bilateral neural foraminal narrowing.    At the C7-T1 disc space level there is no significant canal compromise or neural foraminal narrowing.    PARASPINAL: No extraspinal abnormality. Visualized lung fields are clear.      Impression    IMPRESSION:  HEAD CT:  1.  No CT finding of a mass, hemorrhage or focal area suggestive of acute infarct.  2.  Diffuse age related changes.    CERVICAL SPINE CT:  1.  No CT evidence for acute fracture or post traumatic subluxation.  2.  Diffuse degenerative disc disease with prominent facet arthropathy most prominent at the C5-C6 and the C6-C7 disc space levels as described above.   CT Cervical Spine w/o Contrast    Narrative    EXAM: CT HEAD W/O CONTRAST, CT CERVICAL SPINE W/O CONTRAST  LOCATION: [facility]  DATE: 2/9/2024    INDICATION: fall, head trauma  with altered mental status and neck pain.  COMPARISON: None.  TECHNIQUE:   1) Routine CT Head without IV contrast. Multiplanar reformats. Dose reduction techniques were used.  2) Routine CT Cervical Spine without IV contrast. Multiplanar reformats. Dose reduction techniques were used.    FINDINGS:   HEAD CT:   INTRACRANIAL CONTENTS: No intracranial hemorrhage, extraaxial collection, or mass effect.  No CT evidence of acute infarct. There is scattered diffuse low attenuation within the periventricular and subcortical white matter consistent with diffuse small   vessel ischemic disease. The ventricular system, basal cisterns and the cortical sulci are consistent with volume loss.     VISUALIZED ORBITS/SINUSES/MASTOIDS: No intraorbital abnormality. No paranasal sinus mucosal disease. No middle ear or mastoid effusion.    BONES/SOFT TISSUES: No acute abnormality.    CERVICAL SPINE CT:   VERTEBRA: There is a slight anterior listhesis of C3 in relation to C4 and a mild anterior listhesis of C4 in relation to C5 and C7 in relation to T1. The vertebral body heights are well-maintained throughout. No fracture or posttraumatic subluxation.     CANAL/FORAMINA: There is prominent diffuse degenerative disc disease throughout the cervical sine most prominent at the C5-C6 and the C6-C7 disc space levels with prominent anterior osteophyte formation and posterior osteophyte formation. There is   diffuse facet arthropathy visualized.    At the C3-C4 disc space level there is moderate left neural foraminal narrowing.    At the C4-C5 disc space level there is mild left neural foraminal narrowing.    At the C5-C6 disc space level there is moderate left neural foraminal narrowing.    At the C6-C7 disc space level there is mild canal compromise and mild bilateral neural foraminal narrowing.    At the C7-T1 disc space level there is no significant canal compromise or neural foraminal narrowing.    PARASPINAL: No extraspinal abnormality.  Visualized lung fields are clear.      Impression    IMPRESSION:  HEAD CT:  1.  No CT finding of a mass, hemorrhage or focal area suggestive of acute infarct.  2.  Diffuse age related changes.    CERVICAL SPINE CT:  1.  No CT evidence for acute fracture or post traumatic subluxation.  2.  Diffuse degenerative disc disease with prominent facet arthropathy most prominent at the C5-C6 and the C6-C7 disc space levels as described above.

## 2024-02-13 NOTE — PROGRESS NOTES
[facility]    Medicine Progress Note - Hospitalist Service    Date of Admission:  2/8/2024    Assessment & Plan   Leola Anguiano is a 83 year-old female with past medical history including depression, diabetes mellitus type 2, hyperlipidemia, hypertension, obesity, gout who presents with unwitnessed fall, weakness, confusion. Admitted on 2/8/2024.      COVID19 infection   Acute hypoxic respiratory failure   Generalized weakness  Unwitnessed fall   *Symptom onset ~2/1/24 with cough, rhinitis. Subsequently with increased weakness  *Patient found on ground by son AM of 2/8, unsure how long she was on ground, possibly overnight  *COVID19/Influenza/RSV PCR positive for COVID  *CXR with no focal infiltrate   *Head CT, CT cervical spine negative for acute fracture  *Hypoxic to 86% on room air in ED  - Special precautions   - Dexamethasone 6 mg daily for 10 days or hospital discharge  - Remdesivir for 5 days or hospital discharge  - Enoxaparin subcutaneous for DVT prophylaxis   - Oxygen as needed, wean as tolerated.  Currently weaned off, satting well on room air.  - Famotidine for GI prophylaxis  -Therapies recommending TCU versus home with home therapies.  Patient and family prefer that she discharge home.     Acute encephalopathy-improved  Possible concussion  *Per son, patient is typically sharp at baseline and manages all her own ADLs at home  *Since fall, has seemed to be more confused  *Unclear if related to COVID or possible concussion from fall   - OT consulted.  Slums score was 9/30 on 2/9.  Son however notes that patient was quite sleepy that day and he does not believe that this was an accurate representation of her abilities as she was not having a good day that day.  He feels that her mentation is much improved now and he is not concerned about her cognition.  - Re-orient as needed  - Maintain normal day/night, sleep wake cycles  - Minimize sedating/altering medications as able  -  "Treat separate conditions as detailed above/below      Rhabdomyolysis, mild, improving  Prerenal azotemia  *CK 1781, AST 68, likely from prolonged period down  *UA with trace blood, no RBC  *BUN 29, creatinine normal  -Discontinue IVF on 2/11  -CK improved to around 600 on 2/11  - Monitor BMP      Diabetes mellitus, type 2   HgbA1c 6.3% 2/14/23. Prior to admission on metformin  - Medium sliding scale insulin   - Hold oral medications while hospitalized  - Hypoglycemia protocol     Mild hyponatremia-resolved with IVF  Sodium 134. Likely hypovolemic.  Holding PTA hydrochlorothiazide.     Scalp laceration  *Repaired with wound glue in ED.  *Tetanus injection given  - Keep open with no ointment, allow to fall off on own     Hyperlipidemia  - Hold prior to admission statin until CK normalizes     Insomnia  - Hold prior to admission gabapentin until mental status normalizes     Hypertension  -Resumed PTA metoprolol on 2/11.  Continue to hold PTA hydrochlorothiazide and lisinopril.  BP is soft.  Monitor orthostatics.  Metoprolol placed on hold, she received only 1 dose, noted to be bradycardic in the 40s.  -At discharge, will likely resume only on low-dose lisinopril.  Despite not getting any of her antihypertensives during this hospitalization, BP has been in the low normal range.     Gout  - Resume prior to admission allopurinol      Normocytic anemia  -Hemoglobin 10.3.  Monitor.  No evidence of bleeding.  It was 11.7 at admission.  Could be dilutional secondary to IVF.     Diet: Combination Diet Moderate Consistent Carb (60 g CHO per Meal) Diet    DVT Prophylaxis: Enoxaparin (Lovenox) SQ  Marte Catheter: Not present  Lines: None     Cardiac Monitoring: None  Code Status: Full Code      Clinically Significant Risk Factors                         # Severe Obesity: Estimated body mass index is 41.59 kg/m  as calculated from the following:    Height as of this encounter: 1.448 m (4' 9.01\").    Weight as of this encounter: " 87.2 kg (192 lb 3.9 oz)., PRESENT ON ADMISSION     # Financial/Environmental Concerns: none         Disposition Plan discussed at length with patient and her son in the room.  Most likely plan to discharge home tomorrow.     Expected Discharge Date: 02/13/2024      Destination: inpatient rehabilitation facility              Marie Fleming MD  Hospitalist Service  [facility]  Securely message with [employer] (more info)  Text page via [employer] Paging/Directory   ______________________________________________________________________    Interval History   Stable and afebrile overnight.  Feeling a lot better.  Met with patient along with her son in the room who helped translate.  Anticipating discharge home tomorrow.  Patient and family prefer home and not TCU.  Patient's son thinks that she is weak but improving, tells me he is not too concerned about cognition and notes that she is doing good and he will be able to check up on her.      Physical Exam   Vital Signs: Temp: 98.1  F (36.7  C) Temp src: Axillary BP: (!) 146/70 Pulse: 60   Resp: 16 SpO2: 94 % O2 Device: None (Room air)    Weight: 192 lbs 3.86 oz    General Appearance: Alert, awake and no apparent distress, appears to be answering questions appropriately  Respiratory: clear to auscultation bilaterally  Cardiovascular: regular rate and rhythm  GI: soft and non-tender  Skin: warm and dry      Medical Decision Making           Data     I have personally reviewed the following data over the past 24 hrs:    N/A  \   11.9   / 240     N/A N/A N/A /  159 (H)   N/A N/A 0.68 \       Imaging results reviewed over the past 24 hrs:   No results found for this or any previous visit (from the past 24 hour(s)).

## 2024-02-13 NOTE — PLAN OF CARE
Goal Outcome Evaluation:       Date/Time: 2/12/24 [id number]    Diagnosis: Unwitnessed fall and AMS  Mental Status: A&Ox 3-4  Activity/dangle: A1 GB/W  Diet: Mod Carb  Pain:Denies  Marte/Voiding:continent of B&B, Walks to BR  Tele/Restraints/Iso:COVID-Recovered  02/LDA: 2L O2; PIV SL  D/C Date:TBD  Other Info:  Omani Speaking    Scalp Wound open to air  Pt A to self and place but disoriented to time and situation. Rusian speaking interpretor utilized for every interaction. Pt denied pain but did report some confusion around time of day. She was up 1 assist, GB, walker to utilize the bathroom. BS checked and insulin coverage.

## 2024-02-13 NOTE — PROGRESS NOTES
Care Management Follow Up    Length of Stay (days): 4    Expected Discharge Date: 02/13/2024     Concerns to be Addressed: discharge planning     Patient plan of care discussed at interdisciplinary rounds: Yes    Anticipated Discharge Disposition: Transitional Care     Anticipated Discharge Services: None  Anticipated Discharge DME: None    Patient/family educated on Medicare website which has current facility and service quality ratings: yes  Education Provided on the Discharge Plan:    Patient/Family in Agreement with the Plan: yes    Referrals Placed by CM/SW:    Private pay costs discussed: Not applicable    Additional Information:  Writer called the following facilities for updates on referrals for TCU sent:  1. [facility] ( [phone]): Writer spoke with Debi who stated concern about the patient's COVID status. Writer was updated by CCRN that ID backdated the initial contact date to 02/01, therefore, the precautions ended on 02/11. [facility]bi stated they are back up in referrals but will call the writer upon review.     2. [facility] ([phone]): left a VM  3. [facility] ([phone]): Left a VM  4: [facility] ( [phone]): [facility] updated the writer that they were backup and no bed until the end of the week. Will call back upon review.     Addendum 3357:  Writer got a call from [facility] inquiring about the patient's Social Security for insurance purposes.  Result also inquired about patient's son to be able to translate.  Writer stated they can contact the patient's son but was unsure if they are working.  Writer will follow-up regarding what kind of bed they would prefer for TCU.    Addendum 9327:  Writer got an update from bedside nurse that the patient is going home now.    Writer called [facility] to inform them to disregard the referral for TCU.    Writer received a call from the patient's health plan care management to update them when a final discharge plan is formulated.  Care management is to call Alfreda at  [phone].      MARINA Rowe  [facility]  Social Work

## 2024-02-14 LAB
ATRIAL RATE - MUSE: 58 BPM
DIASTOLIC BLOOD PRESSURE - MUSE: NORMAL MMHG
INTERPRETATION ECG - MUSE: NORMAL
P AXIS - MUSE: 9 DEGREES
PR INTERVAL - MUSE: 160 MS
QRS DURATION - MUSE: 90 MS
QT - MUSE: 414 MS
QTC - MUSE: 406 MS
R AXIS - MUSE: -36 DEGREES
SYSTOLIC BLOOD PRESSURE - MUSE: NORMAL MMHG
T AXIS - MUSE: 32 DEGREES
VENTRICULAR RATE- MUSE: 58 BPM

## 2024-02-14 NOTE — PLAN OF CARE
Occupational Therapy Discharge Summary    Reason for therapy discharge:    Discharged to home with home therapy.    Progress towards therapy goal(s). See goals on Care Plan in [facility] electronic health record for goal details.  Goals partially met.  Barriers to achieving goals:   discharge from facility.    Therapy recommendation(s):    Pt is currently functioning below baseline d/t decreased activity tolerance and cognition. Pt from [facility] and reports being IND w/ all ADL's at baseline. Pt scored 9/30 on SLUMS on 2/9 with language barrier may impact scoring. 10/12 on home safety questions. Pt and pt's son feel as if pt is safe to discharge home w/ home therapies and continued intermittent assist from son w/ groceries, laundry and showering. If this assist cannot be provied, pt would benefit from continued rehab at TCU setting

## 2024-02-14 NOTE — PLAN OF CARE
Physical Therapy Discharge Summary    Reason for therapy discharge:    Discharged to home with home therapy.    Progress towards therapy goal(s). See goals on Care Plan in [facility] electronic health record for goal details.  Goals not met.  Barriers to achieving goals:   discharge from facility.    Therapy recommendation(s):    Continued therapy is recommended.  Rationale/Recommendations:  Per last treating therapist note: Pt is below baseline. Pt currently requiring assist with all functional mobility. Pt presents with deficits in activity tolerance, balance, strength, and O2 needs. Due to these deficits, pt is a falls risk and unsafe to be home alone. Unclear of level of assist pt's son can provide. If pt's son can provide 24/7 assist anticipate pt will be able to return back to [facility] w/ HHPT. If this level of assist is not available, recommend TCU to address deficits and improve IND with safety and functional mobility..  Continue home exercise program.

## 2024-02-14 NOTE — PLAN OF CARE
Problem: Adult Inpatient Plan of Care  Goal: Absence of Hospital-Acquired Illness or Injury  Intervention: Identify and Manage Fall Risk  Recent Flowsheet Documentation  Taken 2/13/2024 [id number] by Camila Espinosa RN  Safety Promotion/Fall Prevention:   safety round/check completed   nonskid shoes/slippers when out of bed   increased rounding and observation    A/Ox2-3, has some confusion. VSS on RA, elevated BP in the afternoon. Denies headache and dizziness. Encouraged son to give her BP home meds once she got home. Tongan speaking but able to understand and speak some english. Up A-1 w/ GB and walker. Denies pain when asked. Voiding good per BR. Good appetite. Son at bedside. Refused to go to TCU, wants to go home. Discharged to home with son. AVS given to son and all belongings given.

## 2024-02-15 ENCOUNTER — PATIENT OUTREACH (OUTPATIENT)
Dept: CARE COORDINATION | Facility: CLINIC | Age: 84
End: 2024-02-15
Payer: COMMERCIAL

## 2024-02-15 NOTE — PROGRESS NOTES
Clinic Care Coordination Contact  [facility]/Voicemail    Clinical Data: Care Coordinator Outreach    Outreach Documentation Number of Outreach Attempt   2/15/2024   2:00 PM 2        Left message on patient's voicemail with call back information and requested return call.    Care Coordinator will do no further outreaches at this time.    Janet Hodges  [phone]  Care

## (undated) DEVICE — EYE SHIELD PLASTIC

## (undated) DEVICE — EYE PACK CUSTOM ANTERIOR 30DEG TIP CENTURION PPK6682-04

## (undated) DEVICE — Device

## (undated) DEVICE — PACK CATARACT CUSTOM SO DALE SEY32CTFCX

## (undated) DEVICE — EYE PACK BVI READYPAK KIT #3

## (undated) DEVICE — LINEN TOWEL PACK X5 5464

## (undated) DEVICE — EYE SOL BSS 15ML BOTTLE 65079515

## (undated) DEVICE — GLOVE PROTEXIS MICRO 6.0  2D73PM60

## (undated) DEVICE — EYE KNIFE SLIT XSTAR VISITEC 2.5MM 45DEG BEVEL UP 373725

## (undated) DEVICE — CHANG HYDRODISSECTOR CANNULA

## (undated) DEVICE — EYE SOL BSS 500ML

## (undated) DEVICE — GLOVE PROTEXIS W/NEU-THERA 7.5  2D73TE75

## (undated) DEVICE — GLOVE PROTEXIS MICRO 7.0  2D73PM70

## (undated) RX ORDER — LIDOCAINE HYDROCHLORIDE 10 MG/ML
INJECTION, SOLUTION EPIDURAL; INFILTRATION; INTRACAUDAL; PERINEURAL
Status: DISPENSED
Start: 2018-11-12

## (undated) RX ORDER — ONDANSETRON 2 MG/ML
INJECTION INTRAMUSCULAR; INTRAVENOUS
Status: DISPENSED
Start: 2018-11-12